# Patient Record
Sex: MALE | Race: WHITE | Employment: UNEMPLOYED | URBAN - METROPOLITAN AREA
[De-identification: names, ages, dates, MRNs, and addresses within clinical notes are randomized per-mention and may not be internally consistent; named-entity substitution may affect disease eponyms.]

---

## 2023-03-22 ENCOUNTER — HOSPITAL ENCOUNTER (INPATIENT)
Age: 18
LOS: 4 days | Discharge: HOME | DRG: 885 | End: 2023-03-27
Attending: STUDENT IN AN ORGANIZED HEALTH CARE EDUCATION/TRAINING PROGRAM | Admitting: PSYCHIATRY & NEUROLOGY
Payer: OTHER GOVERNMENT

## 2023-03-22 DIAGNOSIS — R45.851 SUICIDAL IDEATION: ICD-10-CM

## 2023-03-22 DIAGNOSIS — Z76.89 ENCOUNTER FOR PSYCHIATRIC ASSESSMENT: Primary | ICD-10-CM

## 2023-03-22 DIAGNOSIS — Z86.59 HISTORY OF COMMAND HALLUCINATIONS: ICD-10-CM

## 2023-03-22 DIAGNOSIS — F20.9 SCHIZOPHRENIA, UNSPECIFIED TYPE (HCC): ICD-10-CM

## 2023-03-22 LAB
ALBUMIN SERPL-MCNC: 4.7 G/DL (ref 3.4–5)
ALBUMIN/GLOB SERPL: 2 {RATIO} (ref 1.1–2.2)
ALP SERPL-CCNC: 53 U/L (ref 40–129)
ALT SERPL-CCNC: 9 U/L (ref 10–40)
AMPHETAMINES UR QL SCN>1000 NG/ML: ABNORMAL
ANION GAP SERPL CALCULATED.3IONS-SCNC: 10 MMOL/L (ref 3–16)
APAP SERPL-MCNC: <5 UG/ML (ref 10–30)
AST SERPL-CCNC: 17 U/L (ref 15–37)
BARBITURATES UR QL SCN>200 NG/ML: ABNORMAL
BASOPHILS # BLD: 0 K/UL (ref 0–0.2)
BASOPHILS NFR BLD: 0.9 %
BENZODIAZ UR QL SCN>200 NG/ML: ABNORMAL
BILIRUB SERPL-MCNC: 1.8 MG/DL (ref 0–1)
BUN SERPL-MCNC: 10 MG/DL (ref 7–20)
CALCIUM SERPL-MCNC: 9.2 MG/DL (ref 8.3–10.6)
CANNABINOIDS UR QL SCN>50 NG/ML: POSITIVE
CHLORIDE SERPL-SCNC: 99 MMOL/L (ref 99–110)
CO2 SERPL-SCNC: 26 MMOL/L (ref 21–32)
COCAINE UR QL SCN: ABNORMAL
CREAT SERPL-MCNC: 0.8 MG/DL (ref 0.9–1.3)
DEPRECATED RDW RBC AUTO: 13.3 % (ref 12.4–15.4)
DRUG SCREEN COMMENT UR-IMP: ABNORMAL
EOSINOPHIL # BLD: 0.1 K/UL (ref 0–0.6)
EOSINOPHIL NFR BLD: 3.3 %
ETHANOLAMINE SERPL-MCNC: NORMAL MG/DL (ref 0–0.08)
FENTANYL SCREEN, URINE: ABNORMAL
FLUAV RNA RESP QL NAA+PROBE: NOT DETECTED
FLUBV RNA RESP QL NAA+PROBE: NOT DETECTED
GFR SERPLBLD CREATININE-BSD FMLA CKD-EPI: >60 ML/MIN/{1.73_M2}
GLUCOSE SERPL-MCNC: 107 MG/DL (ref 70–99)
HCT VFR BLD AUTO: 40.2 % (ref 40.5–52.5)
HGB BLD-MCNC: 13.7 G/DL (ref 13.5–17.5)
LYMPHOCYTES # BLD: 1.7 K/UL (ref 1–5.1)
LYMPHOCYTES NFR BLD: 39.8 %
MCH RBC QN AUTO: 30.6 PG (ref 26–34)
MCHC RBC AUTO-ENTMCNC: 34.2 G/DL (ref 31–36)
MCV RBC AUTO: 89.5 FL (ref 80–100)
METHADONE UR QL SCN>300 NG/ML: ABNORMAL
MONOCYTES # BLD: 0.5 K/UL (ref 0–1.3)
MONOCYTES NFR BLD: 11.2 %
NEUTROPHILS # BLD: 1.9 K/UL (ref 1.7–7.7)
NEUTROPHILS NFR BLD: 44.8 %
OPIATES UR QL SCN>300 NG/ML: ABNORMAL
OXYCODONE UR QL SCN: ABNORMAL
PCP UR QL SCN>25 NG/ML: ABNORMAL
PH UR STRIP: 6 [PH]
PLATELET # BLD AUTO: 225 K/UL (ref 135–450)
PMV BLD AUTO: 9.2 FL (ref 5–10.5)
POTASSIUM SERPL-SCNC: 3.9 MMOL/L (ref 3.5–5.1)
PROT SERPL-MCNC: 7.1 G/DL (ref 6.4–8.2)
RBC # BLD AUTO: 4.49 M/UL (ref 4.2–5.9)
SALICYLATES SERPL-MCNC: <0.3 MG/DL (ref 15–30)
SARS-COV-2 RNA RESP QL NAA+PROBE: NOT DETECTED
SODIUM SERPL-SCNC: 135 MMOL/L (ref 136–145)
WBC # BLD AUTO: 4.4 K/UL (ref 4–11)

## 2023-03-22 PROCEDURE — 80143 DRUG ASSAY ACETAMINOPHEN: CPT

## 2023-03-22 PROCEDURE — 80307 DRUG TEST PRSMV CHEM ANLYZR: CPT

## 2023-03-22 PROCEDURE — 85025 COMPLETE CBC W/AUTO DIFF WBC: CPT

## 2023-03-22 PROCEDURE — 80053 COMPREHEN METABOLIC PANEL: CPT

## 2023-03-22 PROCEDURE — 80179 DRUG ASSAY SALICYLATE: CPT

## 2023-03-22 PROCEDURE — 87636 SARSCOV2 & INF A&B AMP PRB: CPT

## 2023-03-22 PROCEDURE — 36415 COLL VENOUS BLD VENIPUNCTURE: CPT

## 2023-03-22 PROCEDURE — 84443 ASSAY THYROID STIM HORMONE: CPT

## 2023-03-22 PROCEDURE — 99285 EMERGENCY DEPT VISIT HI MDM: CPT

## 2023-03-22 PROCEDURE — 82077 ASSAY SPEC XCP UR&BREATH IA: CPT

## 2023-03-22 RX ORDER — ILOPERIDONE 10 MG/1
10 TABLET ORAL 2 TIMES DAILY
Status: ON HOLD | COMMUNITY
End: 2023-03-27 | Stop reason: HOSPADM

## 2023-03-22 RX ORDER — DIVALPROEX SODIUM 250 MG/1
750 TABLET, DELAYED RELEASE ORAL NIGHTLY
Status: ON HOLD | COMMUNITY
End: 2023-03-27 | Stop reason: HOSPADM

## 2023-03-22 ASSESSMENT — PAIN - FUNCTIONAL ASSESSMENT: PAIN_FUNCTIONAL_ASSESSMENT: NONE - DENIES PAIN

## 2023-03-23 PROBLEM — Z00.00 ENCOUNTER FOR ROUTINE HISTORY AND PHYSICAL EXAMINATION OF ADULT: Status: ACTIVE | Noted: 2023-03-23

## 2023-03-23 PROBLEM — Z86.59 HISTORY OF COMMAND HALLUCINATIONS: Status: ACTIVE | Noted: 2023-03-23

## 2023-03-23 PROBLEM — F12.90 CANNABIS USE, UNCOMPLICATED: Status: ACTIVE | Noted: 2023-03-23

## 2023-03-23 PROBLEM — F29 PSYCHOSIS (HCC): Status: ACTIVE | Noted: 2023-03-23

## 2023-03-23 PROBLEM — F33.9 MAJOR DEPRESSION, RECURRENT (HCC): Status: ACTIVE | Noted: 2023-03-23

## 2023-03-23 PROBLEM — R45.851 SUICIDAL IDEATION: Status: ACTIVE | Noted: 2023-03-23

## 2023-03-23 LAB — TSH SERPL DL<=0.005 MIU/L-ACNC: 1.64 UIU/ML (ref 0.43–4)

## 2023-03-23 PROCEDURE — 6370000000 HC RX 637 (ALT 250 FOR IP): Performed by: PSYCHIATRY & NEUROLOGY

## 2023-03-23 PROCEDURE — 80061 LIPID PANEL: CPT

## 2023-03-23 PROCEDURE — 99221 1ST HOSP IP/OBS SF/LOW 40: CPT | Performed by: NURSE PRACTITIONER

## 2023-03-23 PROCEDURE — 36415 COLL VENOUS BLD VENIPUNCTURE: CPT

## 2023-03-23 PROCEDURE — 83036 HEMOGLOBIN GLYCOSYLATED A1C: CPT

## 2023-03-23 PROCEDURE — 93005 ELECTROCARDIOGRAM TRACING: CPT | Performed by: PSYCHIATRY & NEUROLOGY

## 2023-03-23 PROCEDURE — 1240000000 HC EMOTIONAL WELLNESS R&B

## 2023-03-23 RX ORDER — DIVALPROEX SODIUM 500 MG/1
1000 TABLET, DELAYED RELEASE ORAL NIGHTLY
Status: DISCONTINUED | OUTPATIENT
Start: 2023-03-23 | End: 2023-03-23

## 2023-03-23 RX ORDER — MAGNESIUM HYDROXIDE/ALUMINUM HYDROXICE/SIMETHICONE 120; 1200; 1200 MG/30ML; MG/30ML; MG/30ML
30 SUSPENSION ORAL EVERY 6 HOURS PRN
Status: DISCONTINUED | OUTPATIENT
Start: 2023-03-23 | End: 2023-03-27 | Stop reason: HOSPADM

## 2023-03-23 RX ORDER — TRAZODONE HYDROCHLORIDE 50 MG/1
50 TABLET ORAL NIGHTLY PRN
Status: DISCONTINUED | OUTPATIENT
Start: 2023-03-23 | End: 2023-03-27 | Stop reason: HOSPADM

## 2023-03-23 RX ORDER — HYDROXYZINE 50 MG/1
50 TABLET, FILM COATED ORAL 3 TIMES DAILY PRN
Status: DISCONTINUED | OUTPATIENT
Start: 2023-03-23 | End: 2023-03-27 | Stop reason: HOSPADM

## 2023-03-23 RX ORDER — ACETAMINOPHEN 325 MG/1
650 TABLET ORAL EVERY 8 HOURS PRN
Status: DISCONTINUED | OUTPATIENT
Start: 2023-03-23 | End: 2023-03-27 | Stop reason: HOSPADM

## 2023-03-23 RX ORDER — POLYETHYLENE GLYCOL 3350 17 G
2 POWDER IN PACKET (EA) ORAL
Status: DISCONTINUED | OUTPATIENT
Start: 2023-03-23 | End: 2023-03-23

## 2023-03-23 RX ORDER — BENZTROPINE MESYLATE 1 MG/ML
2 INJECTION INTRAMUSCULAR; INTRAVENOUS 2 TIMES DAILY PRN
Status: DISCONTINUED | OUTPATIENT
Start: 2023-03-23 | End: 2023-03-27 | Stop reason: HOSPADM

## 2023-03-23 RX ORDER — IBUPROFEN 400 MG/1
400 TABLET ORAL EVERY 6 HOURS PRN
Status: DISCONTINUED | OUTPATIENT
Start: 2023-03-23 | End: 2023-03-23

## 2023-03-23 RX ORDER — LORAZEPAM 1 MG/1
1 TABLET ORAL ONCE
Status: COMPLETED | OUTPATIENT
Start: 2023-03-23 | End: 2023-03-23

## 2023-03-23 RX ORDER — OLANZAPINE 10 MG/1
10 TABLET ORAL EVERY 4 HOURS PRN
Status: DISCONTINUED | OUTPATIENT
Start: 2023-03-23 | End: 2023-03-27 | Stop reason: HOSPADM

## 2023-03-23 RX ORDER — ACETAMINOPHEN 325 MG/1
650 TABLET ORAL EVERY 4 HOURS PRN
Status: DISCONTINUED | OUTPATIENT
Start: 2023-03-23 | End: 2023-03-23

## 2023-03-23 RX ORDER — NICOTINE 21 MG/24HR
1 PATCH, TRANSDERMAL 24 HOURS TRANSDERMAL DAILY
Status: DISCONTINUED | OUTPATIENT
Start: 2023-03-23 | End: 2023-03-23

## 2023-03-23 RX ORDER — DIVALPROEX SODIUM 500 MG/1
1000 TABLET, EXTENDED RELEASE ORAL NIGHTLY
Status: DISCONTINUED | OUTPATIENT
Start: 2023-03-23 | End: 2023-03-27 | Stop reason: HOSPADM

## 2023-03-23 RX ADMIN — TRAZODONE HYDROCHLORIDE 50 MG: 50 TABLET ORAL at 21:59

## 2023-03-23 RX ADMIN — TRAZODONE HYDROCHLORIDE 50 MG: 50 TABLET ORAL at 02:03

## 2023-03-23 RX ADMIN — HYDROXYZINE HYDROCHLORIDE 50 MG: 50 TABLET, FILM COATED ORAL at 08:50

## 2023-03-23 RX ADMIN — LORAZEPAM 1 MG: 1 TABLET ORAL at 13:15

## 2023-03-23 RX ADMIN — DIVALPROEX SODIUM 1000 MG: 500 TABLET, FILM COATED, EXTENDED RELEASE ORAL at 20:26

## 2023-03-23 RX ADMIN — OLANZAPINE 10 MG: 10 TABLET, FILM COATED ORAL at 20:26

## 2023-03-23 RX ADMIN — HYDROXYZINE HYDROCHLORIDE 50 MG: 50 TABLET, FILM COATED ORAL at 02:03

## 2023-03-23 SDOH — ECONOMIC STABILITY: INCOME INSECURITY: HOW HARD IS IT FOR YOU TO PAY FOR THE VERY BASICS LIKE FOOD, HOUSING, MEDICAL CARE, AND HEATING?: NOT VERY HARD

## 2023-03-23 SDOH — SOCIAL STABILITY: SOCIAL INSECURITY: WITHIN THE LAST YEAR, HAVE YOU BEEN AFRAID OF YOUR PARTNER OR EX-PARTNER?: NO

## 2023-03-23 SDOH — ECONOMIC STABILITY: TRANSPORTATION INSECURITY
IN THE PAST 12 MONTHS, HAS THE LACK OF TRANSPORTATION KEPT YOU FROM MEDICAL APPOINTMENTS OR FROM GETTING MEDICATIONS?: NO

## 2023-03-23 SDOH — SOCIAL STABILITY: SOCIAL INSECURITY
WITHIN THE LAST YEAR, HAVE TO BEEN RAPED OR FORCED TO HAVE ANY KIND OF SEXUAL ACTIVITY BY YOUR PARTNER OR EX-PARTNER?: NO

## 2023-03-23 SDOH — ECONOMIC STABILITY: HOUSING INSECURITY
IN THE LAST 12 MONTHS, WAS THERE A TIME WHEN YOU DID NOT HAVE A STEADY PLACE TO SLEEP OR SLEPT IN A SHELTER (INCLUDING NOW)?: NO

## 2023-03-23 SDOH — ECONOMIC STABILITY: HOUSING INSECURITY: IN THE LAST 12 MONTHS, HOW MANY PLACES HAVE YOU LIVED?: 1

## 2023-03-23 SDOH — SOCIAL STABILITY: SOCIAL INSECURITY: WITHIN THE LAST YEAR, HAVE YOU BEEN HUMILIATED OR EMOTIONALLY ABUSED IN OTHER WAYS BY YOUR PARTNER OR EX-PARTNER?: NO

## 2023-03-23 SDOH — ECONOMIC STABILITY: FOOD INSECURITY: WITHIN THE PAST 12 MONTHS, YOU WORRIED THAT YOUR FOOD WOULD RUN OUT BEFORE YOU GOT MONEY TO BUY MORE.: NEVER TRUE

## 2023-03-23 SDOH — HEALTH STABILITY: MENTAL HEALTH: HOW MANY STANDARD DRINKS CONTAINING ALCOHOL DO YOU HAVE ON A TYPICAL DAY?: PATIENT DOES NOT DRINK

## 2023-03-23 SDOH — SOCIAL STABILITY: SOCIAL INSECURITY
WITHIN THE LAST YEAR, HAVE YOU BEEN KICKED, HIT, SLAPPED, OR OTHERWISE PHYSICALLY HURT BY YOUR PARTNER OR EX-PARTNER?: NO

## 2023-03-23 SDOH — ECONOMIC STABILITY: TRANSPORTATION INSECURITY
IN THE PAST 12 MONTHS, HAS LACK OF TRANSPORTATION KEPT YOU FROM MEETINGS, WORK, OR FROM GETTING THINGS NEEDED FOR DAILY LIVING?: NO

## 2023-03-23 SDOH — HEALTH STABILITY: MENTAL HEALTH: HOW OFTEN DO YOU HAVE A DRINK CONTAINING ALCOHOL?: NEVER

## 2023-03-23 SDOH — ECONOMIC STABILITY: INCOME INSECURITY: IN THE LAST 12 MONTHS, WAS THERE A TIME WHEN YOU WERE NOT ABLE TO PAY THE MORTGAGE OR RENT ON TIME?: NO

## 2023-03-23 SDOH — HEALTH STABILITY: MENTAL HEALTH
STRESS IS WHEN SOMEONE FEELS TENSE, NERVOUS, ANXIOUS, OR CAN'T SLEEP AT NIGHT BECAUSE THEIR MIND IS TROUBLED. HOW STRESSED ARE YOU?: VERY MUCH

## 2023-03-23 SDOH — ECONOMIC STABILITY: FOOD INSECURITY: WITHIN THE PAST 12 MONTHS, THE FOOD YOU BOUGHT JUST DIDN'T LAST AND YOU DIDN'T HAVE MONEY TO GET MORE.: NEVER TRUE

## 2023-03-23 ASSESSMENT — PATIENT HEALTH QUESTIONNAIRE - PHQ9
SUM OF ALL RESPONSES TO PHQ QUESTIONS 1-9: 25
SUM OF ALL RESPONSES TO PHQ QUESTIONS 1-9: 25

## 2023-03-23 ASSESSMENT — SLEEP AND FATIGUE QUESTIONNAIRES
AVERAGE NUMBER OF SLEEP HOURS: 4
DO YOU HAVE DIFFICULTY SLEEPING: YES
DO YOU USE A SLEEP AID: NO
AVERAGE NUMBER OF SLEEP HOURS: 3
SLEEP PATTERN: DIFFICULTY FALLING ASLEEP;DISTURBED/INTERRUPTED SLEEP
SLEEP PATTERN: DIFFICULTY FALLING ASLEEP;NIGHTMARES/TERRORS;DISTURBED/INTERRUPTED SLEEP
DO YOU USE A SLEEP AID: NO
DO YOU HAVE DIFFICULTY SLEEPING: YES

## 2023-03-23 ASSESSMENT — LIFESTYLE VARIABLES
HOW MANY STANDARD DRINKS CONTAINING ALCOHOL DO YOU HAVE ON A TYPICAL DAY: PATIENT DOES NOT DRINK
HOW OFTEN DO YOU HAVE A DRINK CONTAINING ALCOHOL: NEVER

## 2023-03-23 NOTE — H&P
Hospital Medicine History & Physical      PCP: DANTE Huntley    Date of Admission: 3/22/2023    Date of Service: Pt seen/examined on 3/23/2023       Chief Complaint:    Chief Complaint   Patient presents with    Psychiatric Evaluation     Brought in by parents tonight;  parents states pt has had a recent traumatic event; and has had S/I and H/I; denies drug/ alcohol use; taking all medications as prescribed; pt states he is hearing voices and sometimes has S/I and H/I        History Of Present Illness: The patient is a 21 y.o. male with asperger's syndrome, Schizoaffective disorder, and PTSD who presented to [facility] ED with complaint of auditory hallucinations, occasional SI, HI. Patient was seen and evaluated in the ED by the ED medical provider, patient was medically cleared for admission to [facility] at [facility]. This note serves as an admission medical H&P.    PCP: DANTE OSUNA  Tobacco use: never  ETOH use: denies  Illicit drug use: Cannabis (medical Card)    Patient denies any symptoms/complaints. Past Medical History:        Diagnosis Date    Asperger's syndrome 2010    PTSD (post-traumatic stress disorder) 2021    Schizoaffective disorder ([facility] [address] 2021       Past Surgical History:    History reviewed. No pertinent surgical history. Medications Prior to Admission:    Prior to Admission medications    Medication Sig Start Date End Date Taking? Authorizing Provider   divalproex (DEPAKOTE) 250 MG DR tablet Take 750 mg by mouth nightly   Yes Historical Provider, MD   iloperidone (FANAPT) 10 MG TABS tablet Take 10 mg by mouth 2 times daily   Yes Historical Provider, MD       Allergies:  Strawberry flavor [flavoring agent]    Social History:    TOBACCO:   reports that he has never smoked. He has never used smokeless tobacco.  ETOH:   reports that he does not currently use alcohol. Family History:   Positive as follows:    History reviewed. No pertinent family history.     REVIEW OF SYSTEMS:       Constitutional:
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
## 2023-03-23 NOTE — GROUP NOTE
Group Therapy Note    Date: 3/23/2023    Group Start Time: 1100  Group End Time: 4899  Group Topic: Recreational    [facility]        Group Therapy Note    Collaborative Gameplay    Group members engaged in game Heads Up, collaborating to provide clues to peers who were guessing musicians/bands/singers. Goals addressed: group cohesion, socialization, cooperation, impulse control     Attendees: 7       Notes:  Pt present and actively engaged across gameplay, pleasant and cooperative with peers across completion of procedure. No needs verbalized at conclusion of group. Will continue encouraging group participation as appropriate. Status After Intervention:  Improved    Participation Level:  Active Listener and Interactive    Participation Quality: Appropriate and Attentive      Speech:  normal      Thought Process/Content: Logical      Affective Functioning: Congruent      Mood: euthymic      Level of consciousness:  Alert and Oriented x4      Response to Learning: Progressing to goal      Endings: None Reported    Modes of Intervention: Support, Socialization, Activity, and Media      Discipline Responsible: Psychoeducational Specialist      Signature:  Casey Muñoz MM, MT-BC

## 2023-03-23 NOTE — H&P
[address]                 [address] [address]                              HISTORY AND PHYSICAL    PATIENT NAME: Fannie Strange                     :        2005  MED REC NO:   [id number]                          ROOM:       2316  ACCOUNT NO:   [de-identified]                           ADMIT DATE: 2023  PROVIDER:     Norah Lazo MD    INDICATION  This is a domiciled, never , unemployed 49-year-old  with a self-reported history of multiple psychiatric diagnoses, who was  brought to our emergency department by his parents complaining of  worsening auditory and visual hallucinations. SOURCES OF INFORMATION:  The patient. Focused record review. CHIEF COMPLAINT:  \"Severe auditory and visual hallucinations, some  tactile. \"    HISTORY OF PRESENT ILLNESS:  The patient reports the last time he felt  his hallucinations were under decent control was about 3 months ago. Over the last few weeks, they have gotten worse. Regarding auditory  hallucinations, he says he hears multiple unfamiliar men's voices, and sometimes his own voice. They \"yell and scream at me, telling me  its all my fault. The tells me to do things which I don't want to do and will never do. \"  When asked for an example, he says \"right  now they are telling me to throw this chair and crush you with it, but I  would never do something like that, I'm not a violent person. \"  They are nearly constant. Regarding visual hallucinations, he says he sees things migrate and move  around, and occasionally sees figures. He says most of the time the  figures are shadowy and without detail, but occasionally he sees a  fully-formed detailed figure. When asked for an example says \"right now there  is a sarah sitting in the chair next to you. \"  He says he also sees  colors, and objects migrating.  He says \"sometimes I see  a large dark figure with a smile from ear-to-ear and
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
## 2023-03-23 NOTE — DISCHARGE INSTRUCTIONS
Advanced Directives:  Patient does not have a surrogate decision maker appointed   Name (if yes): N/A Phone Number: N/A  Patient does not have a psychiatric and/ or medical advanced directive or power of . Patient was offered psychiatric and/ or medical advanced directive or power of  information/completion but declined to complete   Why not? N/A    Discharge Planning is Complete. Discharge Date: 3/27/23  Reason for Hospitalization: Suicidal Ideation   Discharge Diagnosis: Psychosis [facility])  Discharging to: Private Domicile     Your instructions: Your physician here was Marjorie Corbett MD. If you have any questions please call the unit at [phone] for the adult unit or [phone] for [facility]. Please note that we have a patient family advisory [place] that meets the second Wednesday of January and the second Wednesday of July at [address] [facility] in [place] at [facility]. Department leadership would love for you to attend to give feedback on what we are doing well and areas in which we can improve our patient care. Please come if you are able and feel free to reach out to [address] [place] [address] at [phone] with any questions. The crisis number for [facility] is [phone]  You can use this number at any time to access emergency mental health services. Please follow up with your PCP regarding any pending labs. You are connected to [facility] for [address]. You have an upcoming appointment. See below.    Name of Provider: Trevor Helton   Provider specialty/license: Mental Health Therapy  Date and time of appointment: 3/27/23 at 2:00 PM  The type/s of services requested are: Mental Health Therapy  Agency name: [facility]  Address: [address] [place] [address]  Phone Number: [phone]  Special instructions (what to bring to appointment, etc.): Please call in advance if you

## 2023-03-23 NOTE — GROUP NOTE
Group Therapy Note    Date: 3/23/2023    Group Start Time: 1000  Group End Time: 6538  Group Topic: Psychoeducation    [facility]    CANDACE Miguel        Group Therapy Note  Clinician introduced the group to achieving and maintaining balance. They learned how to use the baseline of functioning to measure anxiety, depression, anger and suicidal ideation, ect. They learned how to use meditation music and visualization to achieve calmness. Attendees: 9       Patient's Goal:      Notes:  Patient was cooperative and fully engaged in the group discussion and activity. He participated in the meditation and visualization while listening to the [place] and Manuel singing bowls. Patient reported achieving calm 0-3 on his baseline of functioning after the grounding techniques. Status After Intervention:  Improved    Participation Level:  Active Listener and Interactive    Participation Quality: Appropriate, Attentive, Sharing, and Supportive      Speech:  normal      Thought Process/Content: Linear      Affective Functioning: Congruent      Mood: anxious and fearful      Level of consciousness:  Alert      Response to Learning: Able to retain information      Endings: None Reported    Modes of Intervention: Education, Support, Exploration, and Activity      Discipline Responsible: /Counselor      Signature:  CANDACE Miguel

## 2023-03-23 NOTE — ED PROVIDER NOTES
Alcohol use: Not on file    Drug use: Not on file    Sexual activity: Not on file   Other Topics Concern    Not on file   Social History Narrative    Not on file     Social Determinants of Health     Financial Resource Strain: Not on file   Food Insecurity: Not on file   Transportation Needs: Not on file   Physical Activity: Not on file   Stress: Not on file   Social Connections: Not on file   Intimate Partner Violence: Not on file   Housing Stability: Not on file     No current facility-administered medications for this encounter. Current Outpatient Medications   Medication Sig Dispense Refill    divalproex (DEPAKOTE) 250 MG DR tablet Take 750 mg by mouth nightly      iloperidone (FANAPT) 10 MG TABS tablet Take 10 mg by mouth 2 times daily       Allergies   Allergen Reactions    Strawberry Flavor [Flavoring Agent]        REVIEW OF SYSTEMS  All systems reviewed, pertinent positives per HPI otherwise noted to be negative. PHYSICAL EXAM  ED Triage Vitals [03/22/23 2052]   BP Temp Temp Source Heart Rate Resp SpO2 Height Weight   122/73 98.4 °F (36.9 °C) Oral 98 18 98 % 5' 11\" (1.803 m) 159 lb (72.1 kg)     GENERAL APPEARANCE: Awake and alert. Cooperative. no distress. HENT: Normocephalic. Atraumatic. Mucous membranes are moist  NECK: Supple. Full range of motion of the neck without stiffness or pain. EYES: PERRL. EOM's grossly intact. HEART/CHEST: RRR. No murmurs. LUNGS: Respirations unlabored. CTAB. Good air exchange. Speaking comfortably in full sentences. ABDOMEN: No tenderness. Soft. Non-distended. No masses. No organomegaly. No guarding or rebound. MUSCULOSKELETAL: No extremity edema. Compartments soft. No deformity. No tenderness in the extremities. All extremities neurovascularly intact. SKIN: Warm and dry. No acute rashes. NEUROLOGICAL: Alert and oriented. No gross facial drooping. Strength 5/5, sensation intact. PSYCHIATRIC: Reports intermittent SI/HI.   Does not appear to be

## 2023-03-24 LAB
CHOLEST SERPL-MCNC: 128 MG/DL (ref 0–199)
EKG ATRIAL RATE: 60 BPM
EKG DIAGNOSIS: NORMAL
EKG P AXIS: 22 DEGREES
EKG P-R INTERVAL: 128 MS
EKG Q-T INTERVAL: 398 MS
EKG QRS DURATION: 88 MS
EKG QTC CALCULATION (BAZETT): 398 MS
EKG R AXIS: 78 DEGREES
EKG T AXIS: 58 DEGREES
EKG VENTRICULAR RATE: 60 BPM
HDLC SERPL-MCNC: 50 MG/DL (ref 40–60)
LDLC SERPL CALC-MCNC: 53 MG/DL
TRIGL SERPL-MCNC: 126 MG/DL (ref 0–150)
VLDLC SERPL CALC-MCNC: 25 MG/DL

## 2023-03-24 PROCEDURE — 6370000000 HC RX 637 (ALT 250 FOR IP): Performed by: PSYCHIATRY & NEUROLOGY

## 2023-03-24 PROCEDURE — 93010 ELECTROCARDIOGRAM REPORT: CPT | Performed by: INTERNAL MEDICINE

## 2023-03-24 PROCEDURE — 1240000000 HC EMOTIONAL WELLNESS R&B

## 2023-03-24 RX ADMIN — TRAZODONE HYDROCHLORIDE 50 MG: 50 TABLET ORAL at 23:02

## 2023-03-24 RX ADMIN — DIVALPROEX SODIUM 1000 MG: 500 TABLET, FILM COATED, EXTENDED RELEASE ORAL at 20:36

## 2023-03-24 RX ADMIN — OLANZAPINE 10 MG: 10 TABLET, FILM COATED ORAL at 12:08

## 2023-03-24 RX ADMIN — HYDROXYZINE HYDROCHLORIDE 50 MG: 50 TABLET, FILM COATED ORAL at 12:08

## 2023-03-24 NOTE — GROUP NOTE
Group Therapy Note    Date: 3-23-23    Group Start Time: 2030  Group End Time: 2100  Group Topic: 100 Richy Mccoy RN        Group Therapy Note    Attendees: 11       Patient's Goal:  To not hear voices and have more coping skills. \"My anxiety has been through the roof. \"    Notes:  Discussed at home pt lifts weights to help him cope. Pt trying to find things to do here. Group help and made some suggestions like pacing, doing push ups, punch his pillow or mattress. Pt seemed pleased that the group helped with suggestions. Status After Intervention:  Improved    Participation Level:  Active Listener and Interactive    Participation Quality: Attentive and Sharing      Speech:  has a very soft voice      Thought Process/Content: Logical  Linear      Affective Functioning: Congruent      Mood: anxious and depressed      Level of consciousness:  Alert and Oriented x4      Response to Learning: Able to verbalize current knowledge/experience, Able to verbalize/acknowledge new learning, and Able to retain information      Endings: None Reported    Modes of Intervention: Education, Support, and Socialization      Discipline Responsible: Registered Nurse      Signature:  Nilsa Muro RN

## 2023-03-24 NOTE — BH NOTE
Patient reported having auditory and visual hallucinations. Holding head and rocking back and forth, stating, \"make them stop, make them stop\". Unable to describe the visual hallucinations and reported that the \"voices are calling me bad names\". Unable to redirect, medicated with atarax and zyprexa per prn order with effective results.

## 2023-03-24 NOTE — GROUP NOTE
Group Therapy Note    Date: 3/24/2023    Group Start Time: 1000  Group End Time: 4930  Group Topic: Psychoeducation    [address] [place] [address]        Group Therapy Note    Attendees: 8    Facilitator led a group discussion on communication. Patients explored how non-verbal communication plays an important role in how we express and receive messages to one another. To execute importance of non-verbals, patients played a game of Blinpick, with each member taking turns to express a randomly selected word with only body language and facial expressions. Patients then discussed what challenges arose and how they were, or could be, resolved. Patients were asked to think about how non-verbals play a role in their communications and relationships with others. Notes:  Patient engaged fully in group discussion and provide insight into own non-verbal communication. Patient played an active role in guessing and acting out words for the Blinpick game. Status After Intervention:  Improved    Participation Level:  Active Listener and Interactive    Participation Quality: Appropriate, Attentive, and Sharing      Speech:  normal      Thought Process/Content: Logical  Linear      Affective Functioning: Congruent      Mood: euthymic      Level of consciousness:  Alert, Oriented x4, and Attentive      Response to Learning: Able to verbalize current knowledge/experience, Capable of insight, Able to change behavior, and Progressing to goal      Endings: None Reported    Modes of Intervention: Education, Socialization, and Activity      Discipline Responsible: /Counselor      Signature:  DAMON Coombs

## 2023-03-24 NOTE — BH NOTE
Patient resting quietly in bed with eyes closed. Reported that medication \"really helped me earlier\".

## 2023-03-25 LAB
EST. AVERAGE GLUCOSE BLD GHB EST-MCNC: 88.2 MG/DL
HBA1C MFR BLD: 4.7 %

## 2023-03-25 PROCEDURE — 1240000000 HC EMOTIONAL WELLNESS R&B

## 2023-03-25 PROCEDURE — 6370000000 HC RX 637 (ALT 250 FOR IP): Performed by: PSYCHIATRY & NEUROLOGY

## 2023-03-25 RX ADMIN — TRAZODONE HYDROCHLORIDE 50 MG: 50 TABLET ORAL at 21:14

## 2023-03-25 RX ADMIN — HYDROXYZINE HYDROCHLORIDE 50 MG: 50 TABLET, FILM COATED ORAL at 10:20

## 2023-03-25 RX ADMIN — HYDROXYZINE HYDROCHLORIDE 50 MG: 50 TABLET, FILM COATED ORAL at 21:14

## 2023-03-25 RX ADMIN — OLANZAPINE 10 MG: 10 TABLET, FILM COATED ORAL at 10:20

## 2023-03-25 RX ADMIN — DIVALPROEX SODIUM 1000 MG: 500 TABLET, FILM COATED, EXTENDED RELEASE ORAL at 21:10

## 2023-03-25 RX ADMIN — HYDROXYZINE HYDROCHLORIDE 50 MG: 50 TABLET, FILM COATED ORAL at 01:09

## 2023-03-25 NOTE — GROUP NOTE
Group Therapy Note    Date: 3/25/2023    Group Start Time: 1000  Group End Time: 1100  Group Topic: Cognitive Skills    [address] [place] [address]; CANDACE Streeter        Group Therapy Note    Attendees: 8         Patient's Goal:  Patient will increase understanding of healthy relationships and what their needs are within those relationships    Notes:  Patients completed the 5 Love Languages quiz and learned about the different needs in relationships. Marixa Sloan left the group at the beginning, but returned at the end.  He was attentive, but did not participate in the discussion    Status After Intervention:  Improved    Participation Level: Minimal    Participation Quality: Attentive      Speech:  normal      Thought Process/Content: Logical      Affective Functioning: Congruent      Mood: depressed      Level of consciousness:  Alert and Attentive      Response to Learning: Progressing to goal      Endings: none reported    Modes of Intervention: Education      Discipline Responsible: /Counselor      Signature:  DIGNA Patterson

## 2023-03-25 NOTE — BH NOTE
Patient requesting anxiety pill rates anxiety 6/10. Patient medicated with Hydroxyzine 50 mg po for anxiety.

## 2023-03-25 NOTE — BH NOTE
Patient reported having auditory and visual hallucinations. Holding head and shaking it back and forth, stating, \"I didn't mean to, just stop\". Reported seeing shadow people. One on one provided, attempted distraction with walking around the unit with patient and providing meditation/relaxation in room. Unable to redirect, medicated with atarax and zyprexa per prn order with effective results.  Patient reported that another patient was talking loudly in the group and \"it triggered me, I don't like it when people talk over other people\"

## 2023-03-26 PROCEDURE — 1240000000 HC EMOTIONAL WELLNESS R&B

## 2023-03-26 PROCEDURE — 6370000000 HC RX 637 (ALT 250 FOR IP): Performed by: PSYCHIATRY & NEUROLOGY

## 2023-03-26 RX ORDER — PROPRANOLOL HYDROCHLORIDE 10 MG/1
10 TABLET ORAL ONCE
Status: COMPLETED | OUTPATIENT
Start: 2023-03-26 | End: 2023-03-26

## 2023-03-26 RX ADMIN — ACETAMINOPHEN 650 MG: 325 TABLET ORAL at 09:33

## 2023-03-26 RX ADMIN — PROPRANOLOL HYDROCHLORIDE 10 MG: 10 TABLET ORAL at 14:35

## 2023-03-26 RX ADMIN — TRAZODONE HYDROCHLORIDE 50 MG: 50 TABLET ORAL at 20:53

## 2023-03-26 RX ADMIN — DIVALPROEX SODIUM 1000 MG: 500 TABLET, FILM COATED, EXTENDED RELEASE ORAL at 20:50

## 2023-03-26 ASSESSMENT — PAIN DESCRIPTION - ORIENTATION: ORIENTATION: RIGHT;LEFT

## 2023-03-26 ASSESSMENT — PAIN DESCRIPTION - DESCRIPTORS: DESCRIPTORS: ACHING

## 2023-03-26 ASSESSMENT — PAIN DESCRIPTION - LOCATION: LOCATION: LEG

## 2023-03-26 ASSESSMENT — PAIN SCALES - GENERAL: PAINLEVEL_OUTOF10: 5

## 2023-03-26 ASSESSMENT — PAIN - FUNCTIONAL ASSESSMENT: PAIN_FUNCTIONAL_ASSESSMENT: ACTIVITIES ARE NOT PREVENTED

## 2023-03-26 NOTE — GROUP NOTE
Group Therapy Note    Date: 3/26/2023    Group Start Time: 1100  Group End Time: 6983  Group Topic: Education [address], MSW        Group Therapy Note    Attendees: 9    Group members talked about their perceptions of coping skills and what they are easy/difficult to use. They learned about How/When/Why to use coping skills as well as DBT coping skills. Patient's Goal:      Notes:  Eyal Lopeztarasamm was present for all of group and he participated fully in the conversation about coping skills. Status After Intervention:  Improved    Participation Level:  Active Listener    Participation Quality: Appropriate, Attentive, Sharing, and Supportive      Speech:  normal      Thought Process/Content: Logical      Affective Functioning: Congruent      Mood: euthymic      Level of consciousness:  Alert, Oriented x4, and Attentive      Response to Learning: Able to verbalize current knowledge/experience, Able to verbalize/acknowledge new learning, Able to retain information, Capable of insight, Able to change behavior, and Progressing to goal      Endings: None Reported    Modes of Intervention: Education, Support, Socialization, Exploration, Clarifying, and Problem-solving      Discipline Responsible: /Counselor      Signature:  CANDACE Louis

## 2023-03-26 NOTE — GROUP NOTE
Group Therapy Note    Date: 3/26/2023    Group Start Time: 1300  Group End Time: 2542  Group Topic: Relaxation    [facility]        Group Therapy Note    Attendees: 8    Group members engaged in Mindfulness/Meditation exercises: Deep Breathing and Progressive Muscle Relaxation. Patient's Goal:      Notes:  Tasneem Nathaniel was present for all of group and he fully engaged and participated in the guided meditation. Status After Intervention:  Improved    Participation Level:  Active Listener and Interactive    Participation Quality: Appropriate, Attentive, Sharing, and Supportive      Speech:  normal      Thought Process/Content: Logical      Affective Functioning: Congruent      Mood: euthymic      Level of consciousness:  Alert, Oriented x4, and Attentive      Response to Learning: Able to verbalize current knowledge/experience, Able to verbalize/acknowledge new learning, Able to retain information, Capable of insight, Able to change behavior, and Progressing to goal      Endings: None Reported    Modes of Intervention: Education, Support, Socialization, Exploration, Activity, and Movement      Discipline Responsible: /Counselor      Signature:  CANDACE De La O

## 2023-03-26 NOTE — GROUP NOTE
Group Therapy Note    Date: 3/25/2023    Group Start Time: 6849  Group End Time: 2000  Group Topic:  Group    [address] [place], [place]        Group Therapy Note    Attendees: 8       Patient's Goal:  Pt will learn the benefits of healthy coping strategies and how to overcome barriers, if presented. Pt will also discuss the differences between coping strategies and the consequences from using unhealthy coping strategies. Notes:  Pt was active in group discussion, encouraging of her peers, and stayed for the full duration of group. Pt met goal.    Status After Intervention:  Improved    Participation Level:  Active Listener and Interactive    Participation Quality: Appropriate, Attentive, Sharing, and Supportive      Speech:  soft      Thought Process/Content: Logical      Affective Functioning: Flat      Mood: anxious and depressed      Level of consciousness:  Alert, Oriented x4, and Attentive      Response to Learning: Able to verbalize current knowledge/experience, Able to verbalize/acknowledge new learning, and Progressing to goal      Endings: None Reported    Modes of Intervention: Education, Socialization, and Activity      Discipline Responsible: /Counselor      Signature:  DAMON Zepeda

## 2023-03-26 NOTE — GROUP NOTE
Group Therapy Note    Date: 3/26/2023    Group Start Time: 1500  Group End Time: 4907  Group Topic: [address] [facility]        Group Therapy Note    Attendees: 9    Group members picked 2 songs each and listened to the playlist together. Patient's Goal:      Notes:  Nelli Moore was present for all of group and he actively listened to the music. Status After Intervention:  Improved    Participation Level:  Active Listener and Interactive    Participation Quality: Appropriate, Attentive, Sharing, and Supportive      Speech:  normal      Thought Process/Content: Logical      Affective Functioning: Congruent      Mood: euthymic      Level of consciousness:  Alert, Oriented x4, and Attentive      Response to Learning: Able to verbalize current knowledge/experience, Able to verbalize/acknowledge new learning, Able to retain information, Capable of insight, Able to change behavior, and Progressing to goal      Endings: None Reported    Modes of Intervention: Support, Socialization, Activity, and Media      Discipline Responsible: /Counselor      Signature:  CANDACE Hernandez

## 2023-03-26 NOTE — BH NOTE
At 16:20 patient c/o feeling light headed, weak and nauseous. Writer encouraged patient to sit down. Vital signs obtained. Snack given. Fluids given and encourage. Writer sat with patient for several minutes. Patient ate dinner and stated that he was feeling much better. No further symptoms verbalized or noted.

## 2023-03-27 VITALS
HEART RATE: 78 BPM | DIASTOLIC BLOOD PRESSURE: 55 MMHG | RESPIRATION RATE: 16 BRPM | OXYGEN SATURATION: 97 % | HEIGHT: 71 IN | SYSTOLIC BLOOD PRESSURE: 106 MMHG | TEMPERATURE: 97.5 F | WEIGHT: 159 LBS | BODY MASS INDEX: 22.26 KG/M2

## 2023-03-27 PROCEDURE — 5130000000 HC BRIDGE APPOINTMENT

## 2023-03-27 RX ORDER — DIVALPROEX SODIUM 500 MG/1
1000 TABLET, EXTENDED RELEASE ORAL NIGHTLY
Qty: 60 TABLET | Refills: 0 | Status: ON HOLD | OUTPATIENT
Start: 2023-03-27 | End: 2023-04-26

## 2023-03-27 RX ORDER — ILOPERIDONE 10 MG/1
15 TABLET ORAL 2 TIMES DAILY
Qty: 90 TABLET | Refills: 0 | Status: ON HOLD | OUTPATIENT
Start: 2023-03-27 | End: 2023-04-26

## 2023-03-27 NOTE — DISCHARGE SUMMARY
CBC  within normal limits. COVID-19 negative. Flu negative. FORMULATION on admission: This is a domiciled, never , unemployed 20-year-old with self-reported history of multiple psychiatric diagnoses, who was brought to our emergency department by his parents reporting worsening auditory and visual hallucinations. His descriptions of his  hallucinations are atypical.  He does not present with other symptoms of  schizophrenia such as disorganized thinking nor negative symptoms. He  was admitted for further evaluation, stabilization and treatment. DIAGNOSES on admission:  1. Psychosis, unspecified. 2.  Cannabis use. 3.  Rule out autism spectrum disorder and personality disorder. Hospital Course:   1. Admitted to Psychiatry for further evaluation, stabilization and  treatment. 2.  On admission, increase Fanapt to 15 mg twice daily. Increased Depakote to 1000 mg and switched to ER formulation. Ordered q.15-minute checks for safety, programming, and p.r.n. medication for anxiety, agitation, and insomnia. 3/24/2023 - Hasn't resumed Fanapt yet - our pharmacy doesn't carry it and his mother wasn't able to bring it before this afternoon. Tolerating increased dose of Depakote. 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 Insists he would never hurt anyone or himself despite the hallucinations he reports to have. \"I know it's wrong. I'm not a violent person and I don't want to got to [facility]. \"     3/25/2023 - resumed Fanatp last night at higher dose. Restless and agitated today - pacing and shifting - but says it's not new; that ir's part of autism. Despite this, will monitor for akathisia. Overall says he's doing better and the AH have improved. Ok to play finger harp in milieu. Mr. Era Prader stabilized and improved with admission and treatment including medication, programming, and the structured milieu.  His reported auditory and

## 2023-03-27 NOTE — GROUP NOTE
Group Therapy Note    Date: 3/27/2023    Group Start Time: 1100  Group End Time: 2497  Group Topic: Psychoeducation    [address] [place] [address]        Group Therapy Note    Attendees: 7    Facilitator led a group discussion and activity on coping skills. Patients explored what coping skills are and their significance on mental health. Patients then played \"guess the coping skill\". Facilitator tati a horizontal dash for each letter of a mystery coping skill and patients had to work as a team to guess the correct letters and solve the word. Once the coping skill was solved patient discussed how to implement it and how it can benefit. Notes:  Patient was fully engaged in group discussion and activity. Status After Intervention:  Improved    Participation Level:  Active Listener and Interactive    Participation Quality: Appropriate, Attentive, and Sharing      Speech:  normal      Thought Process/Content: Logical  Linear      Affective Functioning: Congruent      Mood: euthymic      Level of consciousness:  Alert, Oriented x4, and Attentive      Response to Learning: Able to verbalize current knowledge/experience, Able to verbalize/acknowledge new learning, Able to change behavior, and Progressing to goal      Endings: None Reported    Modes of Intervention: Education, Socialization, and Activity      Discipline Responsible: /Counselor      Signature:  DAMON Mejia
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
## 2023-03-27 NOTE — BH NOTE
[facility]  Discharge Note    Pt discharged with followings belongings:   Dental Appliances: None  Vision - Corrective Lenses: Eyeglasses  Hearing Aid: None  Jewelry: None  Body Piercings Removed: N/A  Clothing: Pants, Shirt, Jacket/Coat, Hat, Slippers  Other Valuables: Wallet (and credit card)   Valuables sent home with patient or returned to patient. Patient educated on aftercare instructions: yes. Information faxed to [facility] by this writer  at 12:05 PM .Patient verbalize understanding of AVS:  yes. Status EXAM upon discharge:  Mental Status and Behavioral Exam  Normal: No  Level of Assistance: Independent/Self  Facial Expression: Brightened  Affect: Appropriate  Level of Consciousness: Alert  Frequency of Checks: 4 times per hour, close  Mood:Normal: Yes  Mood: Anxious  Motor Activity:Normal: Yes  Motor Activity: Decreased  Eye Contact: Good  Observed Behavior: Cooperative, Friendly  Sexual Misconduct History: Current - no  Preception: [place] to person, Cycl[place] to place, [place] to time, [place] to situation  Attention:Normal: No  Attention: Distractible  Thought Processes: Perseveration  Thought Content:Normal: No  Thought Content: Preoccupations  Depression Symptoms: Impaired concentration, Sleep disturbance  Anxiety Symptoms: Generalized  Stephanie Symptoms: No problems reported or observed. Hallucinations:  Auditory (comment)  Delusions: No  Delusions: Persecutory  Memory:Normal: No  Memory: Poor recent, Poor remote  Insight and Judgment: No  Insight and Judgment: Poor judgment, Poor insight    Tobacco Screening:  Practical Counseling, on admission, lucy X, if applicable and completed (first 3 are required if patient doesn't refuse):            ( ) Recognizing danger situations (included triggers and roadblocks)                    ( ) Coping skills (new ways to manage stress,relaxation techniques, changing routine, distraction)

## 2023-03-27 NOTE — GROUP NOTE
Group Therapy Note    Date: 3/27/2023    Group Start Time: 1000  Group End Time: 8310  Group Topic: Psychoeducation    [facility]    CANDACE Beasley        Group Therapy Note  Clinician introduced the group members to creating heathy routines. Clinician had the group members note the most important things to add to their routines. Patient named medications, sleep, balanced diet, hygiene, and caring for others. Patients built a daily/weekly routine to follow with their important topics they created. Attendees: 7       Patient's Goal:      Notes:  Patient was cooperative and fully engaged in the group discussion and activity. Patient completed his check list to tailor his daily routine to fit his needs. Status After Intervention:  Improved    Participation Level:  Active Listener and Interactive    Participation Quality: Appropriate, Attentive, Sharing, and Supportive      Speech:  normal      Thought Process/Content: Logical      Affective Functioning: Congruent      Mood: anxious      Level of consciousness:  Attentive      Response to Learning: Able to verbalize current knowledge/experience      Endings: None Reported    Modes of Intervention: Education, Support, Exploration, and Activity      Discipline Responsible: /Counselor      Signature:  CANDACE Beasley

## 2023-03-27 NOTE — PLAN OF CARE
Patient alert and oriented x 3. Patient seclusive to his room this evening. Patient encouraged to come out to the milieu but refused. Patient denies SI/HI/V/H. Patient states, \"The voices are making fun of me. \" \"One of the voices are telling me am fat-ass. \" \"The rest of the voices, \"I can't make out. \" Patient took HS medications. Patient denies self harm. No angry outbursts noted. Patient resting quietly in bed @ present. No c/o's voiced @ present.
Patient was pleasant and cooperative, friendly and social with staff and peers. Denies SI/HI but having AVH, when asked about hallucinations, patient stated that he felt like someone touched the back of his neck but no one was there. Patient stated that hallucinations have been less this evening. Patient stated to feeling restless and not sleeping well. PRN atarax and trazodone given. Problem: Psychosis  Goal: Will report no hallucinations or delusions  Description: INTERVENTIONS:  1. Administer medication as  ordered  2. Assist with reality testing to support increasing orientation  3. Assess if patient's hallucinations or delusions are encouraging self harm or harm to others and intervene as appropriate  3/25/2023 2131 by Chantell Velásquez LPN  Outcome: Not Progressing     Problem: Risk for Elopement  Goal: Patient will not exit the unit/facility without proper excort  3/25/2023 2131 by Chantell Velásquez LPN  Outcome: Progressing     Problem: Self Harm/Suicidality  Goal: Will have no self-injury during hospital stay  Description: INTERVENTIONS:  1. Ensure constant observer at bedside with Q15M safety checks  2. Maintain a safe environment  3. Secure patient belongings  4. Ensure family/visitors adhere to safety recommendations  5. Ensure safety tray has been added to patient's diet order  6. Every shift and PRN: Re-assess suicidal risk via Frequent Screener    3/25/2023 2131 by Chantell Velásquez LPN  Outcome: Progressing     Problem: Anxiety  Goal: Will report anxiety at manageable levels  Description: INTERVENTIONS:  1. Administer medication as ordered  2. Teach and rehearse alternative coping skills  3. Provide emotional support with 1:1 interaction with staff  3/25/2023 2131 by Chantell Velásquez LPN  Outcome: Progressing     Problem: Psychosis  Goal: Will report no hallucinations or delusions  Description: INTERVENTIONS:  1. Administer medication as  ordered  2.  Assist with reality testing to support
Problem: Risk for Elopement  Goal: Patient will not exit the unit/facility without proper excort  3/26/2023 1125 by Phyllis Vogt RN  Outcome: Not Progressing  3/25/2023 2131 by Katie Arthur LPN  Outcome: Progressing     Problem: Self Harm/Suicidality  Goal: Will have no self-injury during hospital stay  Description: INTERVENTIONS:  1. Ensure constant observer at bedside with Q15M safety checks  2. Maintain a safe environment  3. Secure patient belongings  4. Ensure family/visitors adhere to safety recommendations  5. Ensure safety tray has been added to patient's diet order  6. Every shift and PRN: Re-assess suicidal risk via Frequent Screener    3/26/2023 1125 by Phyllis Vogt RN  Outcome: Not Progressing  3/25/2023 2131 by Katie Arthur LPN  Outcome: Progressing     Problem: Anxiety  Goal: Will report anxiety at manageable levels  Description: INTERVENTIONS:  1. Administer medication as ordered  2. Teach and rehearse alternative coping skills  3. Provide emotional support with 1:1 interaction with staff  3/26/2023 1125 by Phyllis Vogt RN  Outcome: Not Progressing  3/25/2023 2131 by Katie Arthur LPN  Outcome: Progressing     Problem: Depression/Self Harm  Goal: Effect of psychiatric condition will be minimized and patient will be protected from self harm  Description: INTERVENTIONS:  1. Assess impact of patient's symptoms on level of functioning, self care needs and offer support as indicated  2. Assess patient/family knowledge of depression, impact on illness and need for teaching  3. Provide emotional support, presence and reassurance  4. Assess for possible suicidal thoughts or ideation. If patient expresses suicidal thoughts or statements do not leave alone, initiate Suicide Precautions, move to a room close to the nursing station and obtain sitter  5.  Initiate consults as appropriate with Mental Health Professional, Spiritual Care, Psychosocial CNS, and consider a recommendation to the LIP
Problem: Risk for Elopement  Goal: Patient will not exit the unit/facility without proper excort  3/26/2023 2058 by Sofi Coronel LPN  Outcome: Progressing  3/26/2023 1125 by Rossy Lambert RN  Outcome: Not Progressing     Problem: Self Harm/Suicidality  Goal: Will have no self-injury during hospital stay  Description: INTERVENTIONS:  1. Ensure constant observer at bedside with Q15M safety checks  2. Maintain a safe environment  3. Secure patient belongings  4. Ensure family/visitors adhere to safety recommendations  5. Ensure safety tray has been added to patient's diet order  6. Every shift and PRN: Re-assess suicidal risk via Frequent Screener    3/26/2023 2058 by Sofi Coronel LPN  Outcome: Progressing  3/26/2023 1125 by Rossy Lambert RN  Outcome: Not Progressing     Problem: Anxiety  Goal: Will report anxiety at manageable levels  Description: INTERVENTIONS:  1. Administer medication as ordered  2. Teach and rehearse alternative coping skills  3. Provide emotional support with 1:1 interaction with staff  3/26/2023 2058 by Sofi Coronel LPN  Outcome: Progressing  3/26/2023 1125 by Rossy Lambert RN  Outcome: Not Progressing     Problem: Depression/Self Harm  Goal: Effect of psychiatric condition will be minimized and patient will be protected from self harm  Description: INTERVENTIONS:  1. Assess impact of patient's symptoms on level of functioning, self care needs and offer support as indicated  2. Assess patient/family knowledge of depression, impact on illness and need for teaching  3. Provide emotional support, presence and reassurance  4. Assess for possible suicidal thoughts or ideation. If patient expresses suicidal thoughts or statements do not leave alone, initiate Suicide Precautions, move to a room close to the nursing station and obtain sitter  5.  Initiate consults as appropriate with Mental Health Professional, Spiritual Care, Psychosocial CNS, and consider a recommendation to the LIP
Problem: Risk for Elopement  Goal: Patient will not exit the unit/facility without proper excort  Outcome: Progressing     Problem: Self Harm/Suicidality  Goal: Will have no self-injury during hospital stay  Description: INTERVENTIONS:  1. Ensure constant observer at bedside with Q15M safety checks  2. Maintain a safe environment  3. Secure patient belongings  4. Ensure family/visitors adhere to safety recommendations  5. Ensure safety tray has been added to patient's diet order  6. Every shift and PRN: Re-assess suicidal risk via Frequent Screener    Outcome: Progressing     Problem: Anxiety  Goal: Will report anxiety at manageable levels  Description: INTERVENTIONS:  1. Administer medication as ordered  2. Teach and rehearse alternative coping skills  3. Provide emotional support with 1:1 interaction with staff  Outcome: Progressing     Problem: Depression/Self Harm  Goal: Effect of psychiatric condition will be minimized and patient will be protected from self harm  Description: INTERVENTIONS:  1. Assess impact of patient's symptoms on level of functioning, self care needs and offer support as indicated  2. Assess patient/family knowledge of depression, impact on illness and need for teaching  3. Provide emotional support, presence and reassurance  4. Assess for possible suicidal thoughts or ideation. If patient expresses suicidal thoughts or statements do not leave alone, initiate Suicide Precautions, move to a room close to the nursing station and obtain sitter  5.  Initiate consults as appropriate with Mental Health Professional, Spiritual Care, Psychosocial CNS, and consider a recommendation to the LIP for a Psychiatric Consultation  Outcome: Progressing
Problem: Self Harm/Suicidality  Goal: Will have no self-injury during hospital stay  Description: INTERVENTIONS:  1. Ensure constant observer at bedside with Q15M safety checks  2. Maintain a safe environment  3. Secure patient belongings  4. Ensure family/visitors adhere to safety recommendations  5. Ensure safety tray has been added to patient's diet order  6. Every shift and PRN: Re-assess suicidal risk via Frequent Screener    3/23/2023 1449 by Ramila Talamantes LPN  Outcome: Progressing  Flowsheets (Taken 3/23/2023 1002)  Will have no self-injury during hospital stay:   Ensure constant observer at bedside with Q15M safety checks   Ensure safety tray has been added to patient's diet order   Maintain a safe environment   Every shift and PRN: Re-assess suicidal risk via Frequent Screener     Problem: Anxiety  Goal: Will report anxiety at manageable levels  Description: INTERVENTIONS:  1. Administer medication as ordered  2. Teach and rehearse alternative coping skills  3. Provide emotional support with 1:1 interaction with staff  3/23/2023 1449 by Ramila Talamantes LPN  Outcome: Progressing  Flowsheets  Taken 3/23/2023 1325  Will report anxiety at manageable levels:   Administer medication as ordered   Provide emotional support with 1:1 interaction with staff  Taken 3/23/2023 1002  Will report anxiety at manageable levels: Administer medication as ordered   Pt. Denies SI. Continues to be withdrawn but has made progress coming out of his room. Anxiety controlled today. Pt. Reports auditory and visual hallucinations that influence him to \"harm others\", pt.  States he will not listen and knows they are not real.
SI/HI, reported that the \"voices are not as loud and I still see shadow people at times\". Reported their mood as \"I'm not sure\". Compliant with medications.
knowledge of depression, impact on illness and need for teaching   Provide emotional support, presence and reassurance   Assess for suicidal thoughts or ideation. If patient expresses suicidal thoughts or statements do not leave alone, initiate Suicide Precautions, move near nurse station, obtain sitter   Initiate consults as appropriate with Mental Health Professional, Spiritual Care, Psychosocial CNS, and consider a recommendation to the LIP for a Psychiatric Consultation  Taken 3/23/2023 1324 by April BELINDA Talamantes  Effect of psychiatric condition will be minimized and patient will be protected from self harm:   Assess for suicidal thoughts or ideation. If patient expresses suicidal thoughts or statements do not leave alone, initiate Suicide Precautions, move near nurse station, obtain sitter   Provide emotional support, presence and reassurance     Problem: Psychosis  Goal: Will report no hallucinations or delusions  Description: INTERVENTIONS:  1. Administer medication as  ordered  2. Assist with reality testing to support increasing orientation  3. Assess if patient's hallucinations or delusions are encouraging self harm or harm to others and intervene as appropriate  Outcome: Not Progressing   Pt shows no indication of wanting to elope. Pt denies SI and does CFS. Anxiety, Depression, Psychosis  Pt does seem anxious and depressed. He did attend group tonight and was able to talk in group. Pt is blunted. Pt is having AVH. States the voices tell him to harm himself. Seems frustrated and would like something to help the voices. Gave Zyprexz 10 mg po. Pt appeared more relaxed following the medication. Trazodone 50 mg given po at 2201. Pt sleeping by 2245.
not leave alone, initiate Suicide Precautions, move near nurse station, obtain sitter   Initiate consults as appropriate with Mental Health Professional, Spiritual Care, Psychosocial CNS, and consider a recommendation to the LIP for a Psychiatric Consultation  Taken 3/23/2023 1324 by Ramila Talamantes LPN  Effect of psychiatric condition will be minimized and patient will be protected from self harm:   Assess for suicidal thoughts or ideation. If patient expresses suicidal thoughts or statements do not leave alone, initiate Suicide Precautions, move near nurse station, obtain sitter   Provide emotional support, presence and reassurance     Problem: Psychosis  Goal: Will report no hallucinations or delusions  Description: INTERVENTIONS:  1. Administer medication as  ordered  2. Assist with reality testing to support increasing orientation  3.  Assess if patient's hallucinations or delusions are encouraging self harm or harm to others and intervene as appropriate  3/24/2023 1035 by Sarah Goodwin RN  Outcome: Progressing  3/24/2023 0256 by Wanda Marsh RN  Outcome: Not Progressing
thoughts or statements do not leave alone, initiate Suicide Precautions, move to a room close to the nursing station and obtain sitter  5. Initiate consults as appropriate with Mental Health Professional, Spiritual Care, Psychosocial CNS, and consider a recommendation to the LIP for a Psychiatric Consultation  3/26/2023 1125 by Zabrina Blanco RN  Outcome: Not Progressing     Problem: Psychosis  Goal: Will report no hallucinations or delusions  Description: INTERVENTIONS:  1. Administer medication as  ordered  2. Assist with reality testing to support increasing orientation  3.  Assess if patient's hallucinations or delusions are encouraging self harm or harm to others and intervene as appropriate  3/26/2023 2059 by Antonio Modi LPN  Outcome: Not Progressing  3/26/2023 1125 by Zabrina Blanco RN  Outcome: Not Progressing

## 2023-03-27 NOTE — GROUP NOTE
Group Therapy Note    Date: 3/26/2023    Group Start Time: 2000  Group End Time: 2020  Group Topic: [address] DAMON Callahan        Group Therapy Note    Attendees: 9       Patient's Goal:  \"Learn new coping skills\"    Notes:  Met goal    Status After Intervention:  Improved    Participation Level:  Active Listener and Interactive    Participation Quality: Appropriate, Attentive, Sharing, and Supportive      Speech:  normal      Thought Process/Content: Logical      Affective Functioning: Congruent      Mood: euthymic      Level of consciousness:  Alert, Oriented x4, and Attentive      Response to Learning: Able to verbalize current knowledge/experience, Able to verbalize/acknowledge new learning, and Progressing to goal      Endings: None Reported    Modes of Intervention: Socialization      Discipline Responsible: /Counselor      Signature:  DAMON Diallo

## 2023-03-29 ENCOUNTER — HOSPITAL ENCOUNTER (INPATIENT)
Age: 18
LOS: 4 days | Discharge: HOME | DRG: 885 | End: 2023-04-03
Attending: STUDENT IN AN ORGANIZED HEALTH CARE EDUCATION/TRAINING PROGRAM | Admitting: PSYCHIATRY & NEUROLOGY
Payer: OTHER GOVERNMENT

## 2023-03-29 DIAGNOSIS — R44.0 AUDITORY HALLUCINATIONS: Primary | ICD-10-CM

## 2023-03-29 LAB
ALBUMIN SERPL-MCNC: 4.9 G/DL (ref 3.4–5)
ALBUMIN/GLOB SERPL: 2.5 {RATIO} (ref 1.1–2.2)
ALP SERPL-CCNC: 48 U/L (ref 40–129)
ALT SERPL-CCNC: 10 U/L (ref 10–40)
ANION GAP SERPL CALCULATED.3IONS-SCNC: 15 MMOL/L (ref 3–16)
APAP SERPL-MCNC: <5 UG/ML (ref 10–30)
AST SERPL-CCNC: 10 U/L (ref 15–37)
BASOPHILS # BLD: 0 K/UL (ref 0–0.2)
BASOPHILS NFR BLD: 0.1 %
BILIRUB SERPL-MCNC: 0.3 MG/DL (ref 0–1)
BUN SERPL-MCNC: 11 MG/DL (ref 7–20)
CALCIUM SERPL-MCNC: 10.2 MG/DL (ref 8.3–10.6)
CHLORIDE SERPL-SCNC: 101 MMOL/L (ref 99–110)
CO2 SERPL-SCNC: 27 MMOL/L (ref 21–32)
CREAT SERPL-MCNC: 0.7 MG/DL (ref 0.9–1.3)
DEPRECATED RDW RBC AUTO: 13.2 % (ref 12.4–15.4)
EOSINOPHIL # BLD: 0 K/UL (ref 0–0.6)
EOSINOPHIL NFR BLD: 0.1 %
ETHANOLAMINE SERPL-MCNC: NORMAL MG/DL (ref 0–0.08)
FLUAV RNA RESP QL NAA+PROBE: NOT DETECTED
FLUBV RNA RESP QL NAA+PROBE: NOT DETECTED
GFR SERPLBLD CREATININE-BSD FMLA CKD-EPI: >60 ML/MIN/{1.73_M2}
GLUCOSE SERPL-MCNC: 114 MG/DL (ref 70–99)
HCT VFR BLD AUTO: 42.6 % (ref 40.5–52.5)
HGB BLD-MCNC: 14.4 G/DL (ref 13.5–17.5)
LYMPHOCYTES # BLD: 1.1 K/UL (ref 1–5.1)
LYMPHOCYTES NFR BLD: 15.7 %
MCH RBC QN AUTO: 30.1 PG (ref 26–34)
MCHC RBC AUTO-ENTMCNC: 33.8 G/DL (ref 31–36)
MCV RBC AUTO: 89.3 FL (ref 80–100)
MONOCYTES # BLD: 0.5 K/UL (ref 0–1.3)
MONOCYTES NFR BLD: 7.3 %
NEUTROPHILS # BLD: 5.5 K/UL (ref 1.7–7.7)
NEUTROPHILS NFR BLD: 76.8 %
PLATELET # BLD AUTO: 206 K/UL (ref 135–450)
PMV BLD AUTO: 8.8 FL (ref 5–10.5)
POTASSIUM SERPL-SCNC: 4.1 MMOL/L (ref 3.5–5.1)
PROT SERPL-MCNC: 6.9 G/DL (ref 6.4–8.2)
RBC # BLD AUTO: 4.77 M/UL (ref 4.2–5.9)
SALICYLATES SERPL-MCNC: <0.3 MG/DL (ref 15–30)
SARS-COV-2 RNA RESP QL NAA+PROBE: NOT DETECTED
SODIUM SERPL-SCNC: 143 MMOL/L (ref 136–145)
WBC # BLD AUTO: 7.2 K/UL (ref 4–11)

## 2023-03-29 PROCEDURE — 99285 EMERGENCY DEPT VISIT HI MDM: CPT

## 2023-03-29 PROCEDURE — 85025 COMPLETE CBC W/AUTO DIFF WBC: CPT

## 2023-03-29 PROCEDURE — 80179 DRUG ASSAY SALICYLATE: CPT

## 2023-03-29 PROCEDURE — 82077 ASSAY SPEC XCP UR&BREATH IA: CPT

## 2023-03-29 PROCEDURE — 6370000000 HC RX 637 (ALT 250 FOR IP): Performed by: REGISTERED NURSE

## 2023-03-29 PROCEDURE — 80143 DRUG ASSAY ACETAMINOPHEN: CPT

## 2023-03-29 PROCEDURE — 36415 COLL VENOUS BLD VENIPUNCTURE: CPT

## 2023-03-29 PROCEDURE — 87636 SARSCOV2 & INF A&B AMP PRB: CPT

## 2023-03-29 PROCEDURE — 80053 COMPREHEN METABOLIC PANEL: CPT

## 2023-03-29 RX ORDER — HYDROXYZINE 50 MG/1
50 TABLET, FILM COATED ORAL ONCE
Status: COMPLETED | OUTPATIENT
Start: 2023-03-29 | End: 2023-03-29

## 2023-03-29 RX ADMIN — HYDROXYZINE HYDROCHLORIDE 50 MG: 50 TABLET, FILM COATED ORAL at 22:17

## 2023-03-29 ASSESSMENT — ENCOUNTER SYMPTOMS
SORE THROAT: 0
DIARRHEA: 0
RHINORRHEA: 0
COUGH: 0
CHEST TIGHTNESS: 0
NAUSEA: 0
SHORTNESS OF BREATH: 0
BACK PAIN: 0
CONSTIPATION: 0
VOMITING: 0
ABDOMINAL PAIN: 0

## 2023-03-29 ASSESSMENT — PAIN - FUNCTIONAL ASSESSMENT: PAIN_FUNCTIONAL_ASSESSMENT: NONE - DENIES PAIN

## 2023-03-30 PROBLEM — F39 PSYCHOSIS, AFFECTIVE (HCC): Status: ACTIVE | Noted: 2023-03-30

## 2023-03-30 PROCEDURE — 1240000000 HC EMOTIONAL WELLNESS R&B

## 2023-03-30 PROCEDURE — 6370000000 HC RX 637 (ALT 250 FOR IP): Performed by: PSYCHIATRY & NEUROLOGY

## 2023-03-30 PROCEDURE — 93005 ELECTROCARDIOGRAM TRACING: CPT | Performed by: PSYCHIATRY & NEUROLOGY

## 2023-03-30 RX ORDER — IBUPROFEN 400 MG/1
400 TABLET ORAL EVERY 6 HOURS PRN
Status: DISCONTINUED | OUTPATIENT
Start: 2023-03-30 | End: 2023-03-30

## 2023-03-30 RX ORDER — DIVALPROEX SODIUM 500 MG/1
1000 TABLET, EXTENDED RELEASE ORAL NIGHTLY
Status: DISCONTINUED | OUTPATIENT
Start: 2023-03-30 | End: 2023-04-03 | Stop reason: HOSPADM

## 2023-03-30 RX ORDER — POLYETHYLENE GLYCOL 3350 17 G
2 POWDER IN PACKET (EA) ORAL
Status: DISCONTINUED | OUTPATIENT
Start: 2023-03-30 | End: 2023-04-03 | Stop reason: HOSPADM

## 2023-03-30 RX ORDER — NICOTINE 21 MG/24HR
1 PATCH, TRANSDERMAL 24 HOURS TRANSDERMAL DAILY
Status: DISCONTINUED | OUTPATIENT
Start: 2023-03-30 | End: 2023-03-30

## 2023-03-30 RX ORDER — HYDROXYZINE 50 MG/1
50 TABLET, FILM COATED ORAL 3 TIMES DAILY PRN
Status: DISCONTINUED | OUTPATIENT
Start: 2023-03-30 | End: 2023-04-03 | Stop reason: HOSPADM

## 2023-03-30 RX ORDER — ACETAMINOPHEN 325 MG/1
650 TABLET ORAL EVERY 8 HOURS PRN
Status: DISCONTINUED | OUTPATIENT
Start: 2023-03-30 | End: 2023-04-03 | Stop reason: HOSPADM

## 2023-03-30 RX ORDER — OLANZAPINE 10 MG/1
10 TABLET ORAL 2 TIMES DAILY PRN
Status: DISCONTINUED | OUTPATIENT
Start: 2023-03-30 | End: 2023-04-03 | Stop reason: HOSPADM

## 2023-03-30 RX ORDER — BENZTROPINE MESYLATE 1 MG/ML
2 INJECTION INTRAMUSCULAR; INTRAVENOUS 2 TIMES DAILY PRN
Status: DISCONTINUED | OUTPATIENT
Start: 2023-03-30 | End: 2023-04-03 | Stop reason: HOSPADM

## 2023-03-30 RX ORDER — OLANZAPINE 10 MG/1
10 TABLET ORAL EVERY 4 HOURS PRN
Status: DISCONTINUED | OUTPATIENT
Start: 2023-03-30 | End: 2023-03-30

## 2023-03-30 RX ORDER — MAGNESIUM HYDROXIDE/ALUMINUM HYDROXICE/SIMETHICONE 120; 1200; 1200 MG/30ML; MG/30ML; MG/30ML
30 SUSPENSION ORAL EVERY 6 HOURS PRN
Status: DISCONTINUED | OUTPATIENT
Start: 2023-03-30 | End: 2023-04-03 | Stop reason: HOSPADM

## 2023-03-30 RX ORDER — TRAZODONE HYDROCHLORIDE 50 MG/1
50 TABLET ORAL NIGHTLY PRN
Status: DISCONTINUED | OUTPATIENT
Start: 2023-03-30 | End: 2023-03-30

## 2023-03-30 RX ORDER — ACETAMINOPHEN 325 MG/1
650 TABLET ORAL EVERY 4 HOURS PRN
Status: DISCONTINUED | OUTPATIENT
Start: 2023-03-30 | End: 2023-03-30

## 2023-03-30 RX ORDER — HYDROXYZINE 50 MG/1
50 TABLET, FILM COATED ORAL 3 TIMES DAILY PRN
Status: DISCONTINUED | OUTPATIENT
Start: 2023-03-30 | End: 2023-03-30

## 2023-03-30 RX ADMIN — OLANZAPINE 10 MG: 10 TABLET, FILM COATED ORAL at 15:46

## 2023-03-30 RX ADMIN — DIVALPROEX SODIUM 1000 MG: 500 TABLET, FILM COATED, EXTENDED RELEASE ORAL at 23:56

## 2023-03-30 RX ADMIN — HYDROXYZINE HYDROCHLORIDE 50 MG: 50 TABLET, FILM COATED ORAL at 10:17

## 2023-03-30 ASSESSMENT — PATIENT HEALTH QUESTIONNAIRE - PHQ9: SUM OF ALL RESPONSES TO PHQ QUESTIONS 1-9: 21

## 2023-03-30 ASSESSMENT — SLEEP AND FATIGUE QUESTIONNAIRES
DO YOU USE A SLEEP AID: NO
DO YOU HAVE DIFFICULTY SLEEPING: YES
SLEEP PATTERN: DIFFICULTY FALLING ASLEEP;NIGHTMARES/TERRORS;DISTURBED/INTERRUPTED SLEEP
AVERAGE NUMBER OF SLEEP HOURS: 3

## 2023-03-30 NOTE — ED NOTES
Patient offers complaint of anxiety.; Patient was offered and he accepted atarax 50 mg po.       Jerry Garza RN  03/29/23 7492

## 2023-03-30 NOTE — ED PROVIDER NOTES
Patient signed out to me by Melanie Hidalgo NP. Please refer to her note regarding presentation evaluation to this point. In brief, this 78-year-old male is presenting with anxiety and hallucinations. He had a recent psychiatric admission and was discharged 2 days ago. He reports increasing anxiety and hallucinations Telling him to harm himself. Lab work here was obtained and is reassuring. At the time of signout patient has been medically clear for psychiatric evaluation. After psychiatric evaluation, patient admitted for further treatment.     Impression: Hallucinations     Matt Smith DO  03/30/23 8226
clear.   Eyes:      General: No scleral icterus. Right eye: No discharge. Left eye: No discharge. Extraocular Movements: Extraocular movements intact. Conjunctiva/sclera: Conjunctivae normal.   Cardiovascular:      Rate and Rhythm: Normal rate and regular rhythm. Pulses: Normal pulses. Radial pulses are 2+ on the right side and 2+ on the left side. Heart sounds: Normal heart sounds. No murmur heard. No friction rub. No gallop. Pulmonary:      Effort: Pulmonary effort is normal. No respiratory distress. Breath sounds: Normal breath sounds. No stridor. No wheezing, rhonchi or rales. Musculoskeletal:         General: Normal range of motion. Cervical back: Normal range of motion and neck supple. Skin:     General: Skin is warm and dry. Capillary Refill: Capillary refill takes less than 2 seconds. Neurological:      General: No focal deficit present. Mental Status: He is alert and oriented to person, place, and time. GCS: GCS eye subscore is 4. GCS verbal subscore is 5. GCS motor subscore is 6. Psychiatric:         Attention and Perception: Attention normal. He perceives auditory and visual hallucinations. Mood and Affect: Mood is anxious. Affect is flat. Speech: Speech is rapid and pressured. Behavior: Behavior normal. Behavior is cooperative. Thought Content: Thought content is not paranoid or delusional. Thought content does not include homicidal or suicidal ideation. Thought content does not include homicidal or suicidal plan.          Cognition and Memory: Cognition and memory normal.         Judgment: Judgment normal.      Comments: States hes been having increased feelings of anxiety as well as auditory and visual hallucinations     PHYSICAL EXAM  /78   Pulse 80   Temp 97.4 °F (36.3 °C) (Oral)   Resp 18   Ht 5' 11\" (1.803 m)   Wt 159 lb (72.1 kg)   SpO2 99%   BMI 22.18 kg/m²

## 2023-03-30 NOTE — ED NOTES
To [facility] in hospital attire. Placed in treatment room B3. Calm, and cooperative. Will continue to monitor for pt safety.        Nellie Faulkner  03/29/23 9485
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
## 2023-03-30 NOTE — ED NOTES
Currently resting in treatment room. Appears to be alert, awake, and lying in bed. Will continue to monitor.      Dada Olivares  03/30/23 0137

## 2023-03-30 NOTE — H&P
H&P done with the last 30 days.      ASSESSMENT/PLAN:  Depression, PTSD, schizoaffective disorder  -management per psychiatry     Cannabis use  -medical card       Nicky Gonzalez FNP-C  3/30/2023
domiciled, never , unemployed 61-year-old  with a history of psychotic symptoms, autism, depression, and cannabis  use whose parents brought him to our emergency department with  worsening agitation, distress, and hallucinations. According to his  parents, this is in the setting of treatment nonadherence. He was  admitted for further stabilization and treatment. DIAGNOSES:  1. Psychosis, unspecified. 2.  Autism spectrum disorder. 3.  Cannabis use disorder, moderate dependence. PLAN:  1. Admit to Psychiatry for further evaluation and stabilization. 2.  Continue Depakote 1000 mg nightly. Discontinue Fanapt as he maybe  experiencing significant side effects including akathisia. hold  on starting a new antipsychotic for now. Order q.15-minute checks for  safety, programming, and p.r.n. medication for anxiety, agitation, and  insomnia. 3.  Hospitalist consult for admission. 4.  Collateral information collected at [facility]FF[facility]ATE OF [facility]. 5.  Estimated length of stay 5-8 days for stabilization. He is  voluntary. Seventy five minutes were spent with the patient completing this  evaluation and more than 50% of the time was spent completing this  evaluation, providing counseling, and planning treatment with the  patient.         Tad Gonzales MD    D: 03/30/2023 15:25:27       T: 03/30/2023 15:31:12     [email or web address]  Job#: [id number]     Doc#: [id number]    CC:

## 2023-03-30 NOTE — DISCHARGE INSTRUCTIONS
Valuable(s) Were Given To: Pt mother took items while in ED    By signing below, I understand and acknowledge receipt of the instructions indicated above.

## 2023-03-31 PROBLEM — F84.0 AUTISM SPECTRUM DISORDER: Status: ACTIVE | Noted: 2023-03-31

## 2023-03-31 LAB
EKG ATRIAL RATE: 67 BPM
EKG DIAGNOSIS: NORMAL
EKG P AXIS: 27 DEGREES
EKG P-R INTERVAL: 120 MS
EKG Q-T INTERVAL: 398 MS
EKG QRS DURATION: 78 MS
EKG QTC CALCULATION (BAZETT): 420 MS
EKG R AXIS: 79 DEGREES
EKG T AXIS: 58 DEGREES
EKG VENTRICULAR RATE: 67 BPM

## 2023-03-31 PROCEDURE — 6370000000 HC RX 637 (ALT 250 FOR IP): Performed by: PSYCHIATRY & NEUROLOGY

## 2023-03-31 PROCEDURE — 93010 ELECTROCARDIOGRAM REPORT: CPT | Performed by: INTERNAL MEDICINE

## 2023-03-31 PROCEDURE — 1240000000 HC EMOTIONAL WELLNESS R&B

## 2023-03-31 RX ORDER — PALIPERIDONE 3 MG/1
3 TABLET, EXTENDED RELEASE ORAL DAILY
Status: DISCONTINUED | OUTPATIENT
Start: 2023-03-31 | End: 2023-04-03 | Stop reason: HOSPADM

## 2023-03-31 RX ADMIN — PALIPERIDONE 3 MG: 3 TABLET, EXTENDED RELEASE ORAL at 15:09

## 2023-03-31 RX ADMIN — HYDROXYZINE HYDROCHLORIDE 50 MG: 50 TABLET, FILM COATED ORAL at 20:28

## 2023-03-31 RX ADMIN — ACETAMINOPHEN 650 MG: 325 TABLET ORAL at 10:14

## 2023-03-31 RX ADMIN — DIVALPROEX SODIUM 1000 MG: 500 TABLET, FILM COATED, EXTENDED RELEASE ORAL at 20:28

## 2023-03-31 RX ADMIN — OLANZAPINE 10 MG: 10 TABLET, FILM COATED ORAL at 10:16

## 2023-03-31 ASSESSMENT — PAIN DESCRIPTION - LOCATION: LOCATION: HEAD

## 2023-03-31 ASSESSMENT — PAIN DESCRIPTION - DESCRIPTORS: DESCRIPTORS: ACHING

## 2023-03-31 ASSESSMENT — PAIN SCALES - GENERAL: PAINLEVEL_OUTOF10: 10

## 2023-03-31 NOTE — BH NOTE
Patient c/o headache. Given tylenol per order, see MAR. Also has c/o hearing voices. Dr. Yaneth Stoner aware. Given olanzapine per order.

## 2023-04-01 PROCEDURE — 1240000000 HC EMOTIONAL WELLNESS R&B

## 2023-04-01 PROCEDURE — 6370000000 HC RX 637 (ALT 250 FOR IP): Performed by: PSYCHIATRY & NEUROLOGY

## 2023-04-01 RX ADMIN — ALUMINUM HYDROXIDE, MAGNESIUM HYDROXIDE, AND SIMETHICONE 30 ML: 200; 200; 20 SUSPENSION ORAL at 22:28

## 2023-04-01 RX ADMIN — DIVALPROEX SODIUM 1000 MG: 500 TABLET, FILM COATED, EXTENDED RELEASE ORAL at 20:13

## 2023-04-01 RX ADMIN — OLANZAPINE 10 MG: 10 TABLET, FILM COATED ORAL at 22:46

## 2023-04-01 RX ADMIN — PALIPERIDONE 3 MG: 3 TABLET, EXTENDED RELEASE ORAL at 08:43

## 2023-04-01 RX ADMIN — HYDROXYZINE HYDROCHLORIDE 50 MG: 50 TABLET, FILM COATED ORAL at 18:43

## 2023-04-01 ASSESSMENT — PAIN DESCRIPTION - DESCRIPTORS
DESCRIPTORS: OTHER (COMMENT);CRAMPING
DESCRIPTORS: ACHING
DESCRIPTORS: ACHING

## 2023-04-01 ASSESSMENT — PAIN - FUNCTIONAL ASSESSMENT
PAIN_FUNCTIONAL_ASSESSMENT: ACTIVITIES ARE NOT PREVENTED

## 2023-04-01 ASSESSMENT — PAIN SCALES - WONG BAKER
WONGBAKER_NUMERICALRESPONSE: 0
WONGBAKER_NUMERICALRESPONSE: 0

## 2023-04-01 ASSESSMENT — PAIN DESCRIPTION - PAIN TYPE
TYPE: CHRONIC PAIN
TYPE: CHRONIC PAIN

## 2023-04-01 ASSESSMENT — PAIN DESCRIPTION - ONSET
ONSET: GRADUAL
ONSET: GRADUAL

## 2023-04-01 ASSESSMENT — PAIN DESCRIPTION - LOCATION
LOCATION: ABDOMEN
LOCATION: ABDOMEN

## 2023-04-01 ASSESSMENT — PAIN SCALES - GENERAL
PAINLEVEL_OUTOF10: 0
PAINLEVEL_OUTOF10: 0
PAINLEVEL_OUTOF10: 5
PAINLEVEL_OUTOF10: 0
PAINLEVEL_OUTOF10: 0

## 2023-04-01 ASSESSMENT — PAIN DESCRIPTION - FREQUENCY
FREQUENCY: INTERMITTENT
FREQUENCY: INTERMITTENT

## 2023-04-01 NOTE — CARE COORDINATION
04/01/23 [facility]   C-SSRS Suicide Screening   1) Within the past month, have you wished you were dead or wished you could go to sleep and not wake up? No   2) Have you actually had any thoughts of killing yourself? No   3) Have you been thinking about how you might kill yourself? No   4) Have you had these thoughts and had some intention of acting on them? No   5) Have you started to work out or worked out the details of how to kill yourself? Do you intend to carry out this plan? No   6) Have you ever done anything, started to do anything, or prepared to do anything to end your life? No   Did this occur within the past 3 months? No   Risk of Suicide No Risk   C-SSRS Risk Assessment   Suicidal and Self-Injurious Behavior  Actual suicide attempt - Lifetime   Suicidal Ideation (Most Severe in Past Month) Denies suicidal ideation   Treatment History Previous psychiatric diagnosis and treatments   Clinical Status (Recent) Aggressive behavior towards others   Protective Factors (Recent) Identifies reasons for living   Other Risk Factors Communciation   Other Protective Factors Does have support     Pt is a readmit. Completed CSSR-S. No thoughts of hurting self.     Vaishnavi Peguero, MSW, LSW

## 2023-04-02 PROCEDURE — 1240000000 HC EMOTIONAL WELLNESS R&B

## 2023-04-02 PROCEDURE — 6370000000 HC RX 637 (ALT 250 FOR IP): Performed by: PSYCHIATRY & NEUROLOGY

## 2023-04-02 RX ADMIN — HYDROXYZINE HYDROCHLORIDE 50 MG: 50 TABLET, FILM COATED ORAL at 21:36

## 2023-04-02 RX ADMIN — HYDROXYZINE HYDROCHLORIDE 50 MG: 50 TABLET, FILM COATED ORAL at 15:25

## 2023-04-02 RX ADMIN — PALIPERIDONE 3 MG: 3 TABLET, EXTENDED RELEASE ORAL at 08:31

## 2023-04-02 RX ADMIN — ALUMINUM HYDROXIDE, MAGNESIUM HYDROXIDE, AND SIMETHICONE 30 ML: 200; 200; 20 SUSPENSION ORAL at 22:49

## 2023-04-02 RX ADMIN — DIVALPROEX SODIUM 1000 MG: 500 TABLET, FILM COATED, EXTENDED RELEASE ORAL at 21:36

## 2023-04-02 ASSESSMENT — PAIN SCALES - WONG BAKER: WONGBAKER_NUMERICALRESPONSE: 0

## 2023-04-02 ASSESSMENT — PAIN SCALES - GENERAL
PAINLEVEL_OUTOF10: 1
PAINLEVEL_OUTOF10: 0

## 2023-04-02 NOTE — CARE COORDINATION
[facility]  Treatment Team Note  Review Date & Time:   4/1/23 0900    Patient was not in treatment team      Status EXAM:   Mental Status and Behavioral Exam  Normal: No  Level of Assistance: Independent/Self  Facial Expression: Flat  Affect: Blunt  Level of Consciousness: Alert  Frequency of Checks: 4 times per hour, close  Mood:Normal: No  Mood: Anxious, Depressed  Motor Activity:Normal: No  Motor Activity: Decreased  Eye Contact: Good  Observed Behavior: Cooperative, Withdrawn  Sexual Misconduct History: Current - no  Preception: [place] to person, [place] to time, [place] to place  Attention:Normal: No  Attention: Unable to concentrate  Thought Processes: Circumstantial  Thought Content:Normal: No  Thought Content: Poverty of content  Depression Symptoms: Change in energy level  Anxiety Symptoms: Generalized  Stephanie Symptoms: No problems reported or observed. Hallucinations: Auditory (comment) (patient reports at baseline)  Delusions: No  Delusions: Persecutory  Memory:Normal: No  Memory: Poor recent, Poor remote  Insight and Judgment: No  Insight and Judgment: Poor judgment, Poor insight      Suicide Risk CSSR-S:  1) Within the past month, have you wished you were dead or wished you could go to sleep and not wake up? : No  2) Have you actually had any thoughts of killing yourself? : No  3) Have you been thinking about how you might kill yourself? : No  5) Have you started to work out or worked out the details of how to kill yourself? Do you intend to carry out this plan? : No  6) Have you ever done anything, started to do anything, or prepared to do anything to end your life?: No      PLAN/TREATMENT RECOMMENDATIONS UPDATE: Patient will take medication as prescribed, eat 75% of meals, attend groups, participate in milieu activities, participate in treatment team and care planning for discharge and follow up.             Jersey Peacock RN

## 2023-04-02 NOTE — GROUP NOTE
Group Therapy Note    Date: 4/2/2023    Group Start Time: 2040  Group End Time: 2110  Group Topic: 100 Richy Mccoy RN        Group Therapy Note    Attendees: 10       Patient's Goal:  Not to be so anxious    Notes:  Feels like things are going pretty well. Pegge Schuster it was nice to talk to people and \"just chill. \" Feels like he's doing better now and feels better than when he left last time. Status After Intervention:  Improved    Participation Level:  Active Listener and Interactive    Participation Quality: Appropriate, Attentive, Sharing, and Supportive      Speech:  normal      Thought Process/Content: Logical  Linear      Affective Functioning: Congruent      Mood:slightly anxious      Level of consciousness:  Alert and Oriented x4      Response to Learning: Able to verbalize current knowledge/experience, Able to verbalize/acknowledge new learning, and Able to retain information      Endings: None Reported    Modes of Intervention: Education, Support, and Socialization      Discipline Responsible: Registered Nurse      Signature:  Serenity Doss RN

## 2023-04-03 VITALS
HEIGHT: 71 IN | DIASTOLIC BLOOD PRESSURE: 75 MMHG | TEMPERATURE: 97.9 F | OXYGEN SATURATION: 98 % | BODY MASS INDEX: 22.26 KG/M2 | SYSTOLIC BLOOD PRESSURE: 126 MMHG | WEIGHT: 159 LBS | RESPIRATION RATE: 18 BRPM | HEART RATE: 81 BPM

## 2023-04-03 PROCEDURE — 5130000000 HC BRIDGE APPOINTMENT

## 2023-04-03 PROCEDURE — 6370000000 HC RX 637 (ALT 250 FOR IP): Performed by: PSYCHIATRY & NEUROLOGY

## 2023-04-03 RX ORDER — PALIPERIDONE 3 MG/1
3 TABLET, EXTENDED RELEASE ORAL DAILY
Qty: 30 TABLET | Refills: 0 | Status: SHIPPED | OUTPATIENT
Start: 2023-04-04

## 2023-04-03 RX ORDER — HYDROXYZINE 50 MG/1
50 TABLET, FILM COATED ORAL DAILY PRN
Qty: 20 TABLET | Refills: 0 | Status: SHIPPED | OUTPATIENT
Start: 2023-04-03

## 2023-04-03 RX ADMIN — PALIPERIDONE 3 MG: 3 TABLET, EXTENDED RELEASE ORAL at 08:38

## 2023-04-03 RX ADMIN — HYDROXYZINE HYDROCHLORIDE 50 MG: 50 TABLET, FILM COATED ORAL at 12:41

## 2023-04-03 NOTE — GROUP NOTE
Group Therapy Note    Date: 4/2/2023    Group Start Time: 2045  Group End Time: 2130  Group Topic: 100 Richy Mccoy RN        Group Therapy Note    Attendees: 10       Patient's Goal:  States his goal was to stop crying so much. Notes:  States that he didn't make his goal because he found out that his 24year old cat has cancer. Hopes to leave here tomorrow so he can see the cat before it dies. ( Evidently cat being euthanized tomorrow. Discussed how he cries himself to sleep at night. He says he thinks it's part of his ritual to cry at night more than just being sad. Discussed the pros of crying, etc.    Status After Intervention:  Unchanged    Participation Level:  Active Listener and Interactive    Participation Quality: Appropriate, Attentive, Sharing, and Supportive      Speech:  normal      Thought Process/Content: Logical  Linear      Affective Functioning: Congruent      Mood:  sad about his pet      Level of consciousness:  Alert, Oriented x4, and Attentive      Response to Learning: Able to verbalize current knowledge/experience, Able to verbalize/acknowledge new learning, Able to retain information, and Capable of insight      Endings: None Reported    Modes of Intervention: Education, Support, and Socialization      Discipline Responsible: Registered Nurse      Signature:  Landon Cedillo RN

## 2023-04-03 NOTE — BH NOTE
[facility]  Discharge Note    Pt discharged with followings belongings:   Dental Appliances: None  Vision - Corrective Lenses: Eyeglasses, At bedside  Hearing Aid: None  Jewelry: None  Body Piercings Removed: N/A  Clothing: Jacket/Coat, Pants, Robe, Slippers, Shirt, Socks, Undergarments (parents dropped off bag of clothing)  Other Valuables: Other (Comment) (Pt mother took items home)   Valuables returned to patient. Patient educated on aftercare instructions: yes  Information faxed to [address] [place] [address]- [phone] by Lavella Sacks  at 2:58 PM .Patient verbalize understanding of AVS:  yes. Status EXAM upon discharge:  Mental Status and Behavioral Exam  Normal: Yes  Level of Assistance: Independent/Self  Facial Expression: Brightened  Affect: Appropriate, Stable  Level of Consciousness: Alert  Frequency of Checks: 4 times per hour, close  Mood:Normal: Yes  Mood: Anxious  Motor Activity:Normal: Yes  Motor Activity: Decreased  Eye Contact: Good  Observed Behavior: Friendly, Cooperative  Sexual Misconduct History: Current - no  Preception: [place] to person, [place] to time, [place] to place, [place] to situation  Attention:Normal: Yes  Attention: Distractible  Thought Processes: Unremarkable  Thought Content:Normal: Yes  Thought Content: Preoccupations  Depression Symptoms: Sleep disturbance  Anxiety Symptoms: No problems reported or observed. Stephanie Symptoms: No problems reported or observed. Hallucinations:  Auditory (comment)  Delusions: No  Delusions: Persecutory  Memory:Normal: Yes  Memory: Poor recent, Poor remote  Insight and Judgment: No  Insight and Judgment: Poor judgment, Poor insight    Tobacco Screening:  Practical Counseling, on admission, lucy X, if applicable and completed (first 3 are required if patient doesn't refuse):            ( ) Recognizing danger situations (included triggers and roadblocks)                    ( ) Coping

## 2023-04-03 NOTE — PLAN OF CARE
Padilla Dang has been asleep all evening. He was medicated with PRN Zyprexa PO around 1500 yesterday. He awakened easily for HS Depakote. Denies SI/HI. Denies AVH \"right now. \" Withdrawn and guarded with poor eye contact. States just feels tired. Denies needs. Went back to sleep after HS Depakote. Will continue to monitor.
Patient friendly and cooperative on assessment. Patient A&O, denies all. Patient friendly with staff and peers however patient is currently upset over his cat being sick. PRN atarax given    Problem: Psychosis  Goal: Will report no hallucinations or delusions  Description: INTERVENTIONS:  1. Administer medication as  ordered  2. Assist with reality testing to support increasing orientation  3. Assess if patient's hallucinations or delusions are encouraging self harm or harm to others and intervene as appropriate  4/2/2023 2148 by Cyndi Griffin LPN  Outcome: Not Progressing     Problem: Risk for Elopement  Goal: Patient will not exit the unit/facility without proper excort  4/2/2023 2148 by Cyndi Griffin LPN  Outcome: Progressing     Problem: Self Harm/Suicidality  Goal: Will have no self-injury during hospital stay  Description: INTERVENTIONS:  1. Ensure constant observer at bedside with Q15M safety checks  2. Maintain a safe environment  3. Secure patient belongings  4. Ensure family/visitors adhere to safety recommendations  5. Ensure safety tray has been added to patient's diet order  6. Every shift and PRN: Re-assess suicidal risk via Frequent Screener    4/2/2023 2148 by Cyndi Griffin LPN  Outcome: Progressing     Problem: Involuntary Admit  Goal: Will cooperate with staff recommendations and doctor's orders and will demonstrate appropriate behavior  Description: INTERVENTIONS:  1. Treat underlying conditions and offer medication as ordered  2. Educate regarding involuntary admission procedures and rules  3.  Contain excessive/inappropriate behavior per unit and hospital policies  3/2/6240 [id number] by Cyndi Griffin LPN  Outcome: Progressing     Problem: Pain  Goal: Verbalizes/displays adequate comfort level or baseline comfort level  4/2/2023 2148 by Cyndi Griffin LPN  Outcome: Progressing
Problem: Psychosis  Goal: Will report no hallucinations or delusions  Description: INTERVENTIONS:  1. Administer medication as  ordered  2. Assist with reality testing to support increasing orientation  3. Assess if patient's hallucinations or delusions are encouraging self harm or harm to others and intervene as appropriate  4/1/2023 0310 by Kirt Nuñez RN  Outcome: Nilton Bragg denies current SI/HI, denies A/V/H. Medication compliant and remains absent of self harm thus far this shift. Remains withdrawn to self and minimally social. Poor eye contact.
Problem: Risk for Elopement  Goal: Patient will not exit the unit/facility without proper excort  Outcome: Progressing     Problem: Self Harm/Suicidality  Goal: Will have no self-injury during hospital stay  Description: INTERVENTIONS:  1. Ensure constant observer at bedside with Q15M safety checks  2. Maintain a safe environment  3. Secure patient belongings  4. Ensure family/visitors adhere to safety recommendations  5. Ensure safety tray has been added to patient's diet order  6. Every shift and PRN: Re-assess suicidal risk via Frequent Screener    Outcome: Progressing     Problem: Depression  Goal: Will be euthymic at discharge  Description: INTERVENTIONS:  1. Administer medication as ordered  2. Provide emotional support via 1:1 interaction with staff  3. Encourage involvement in milieu/groups/activities  4. Monitor for social isolation  Outcome: Progressing     Problem: Psychosis  Goal: Will report no hallucinations or delusions  Description: INTERVENTIONS:  1. Administer medication as  ordered  2. Assist with reality testing to support increasing orientation  3. Assess if patient's hallucinations or delusions are encouraging self harm or harm to others and intervene as appropriate  Outcome: Progressing     Problem: Sleep Disturbance  Goal: Will exhibit normal sleeping pattern  Description: INTERVENTIONS:  1. Administer medication as ordered  2. Decrease environmental stimuli, including noise, as appropriate  3. Discourage social isolation and naps during the day  Outcome: Progressing     Problem: Involuntary Admit  Goal: Will cooperate with staff recommendations and doctor's orders and will demonstrate appropriate behavior  Description: INTERVENTIONS:  1. Treat underlying conditions and offer medication as ordered  2. Educate regarding involuntary admission procedures and rules  3.  Contain excessive/inappropriate behavior per unit and hospital policies  Outcome: Progressing
Problem: Risk for Elopement  Goal: Patient will not exit the unit/facility without proper excort  Outcome: Progressing     Problem: Self Harm/Suicidality  Goal: Will have no self-injury during hospital stay  Description: INTERVENTIONS:  1. Ensure constant observer at bedside with Q15M safety checks  2. Maintain a safe environment  3. Secure patient belongings  4. Ensure family/visitors adhere to safety recommendations  5. Ensure safety tray has been added to patient's diet order  6. Every shift and PRN: Re-assess suicidal risk via Frequent Screener    Outcome: Progressing     Problem: Depression  Goal: Will be euthymic at discharge  Description: INTERVENTIONS:  1. Administer medication as ordered  2. Provide emotional support via 1:1 interaction with staff  3. Encourage involvement in milieu/groups/activities  4. Monitor for social isolation  Outcome: Progressing     Problem: Psychosis  Goal: Will report no hallucinations or delusions  Description: INTERVENTIONS:  1. Administer medication as  ordered  2. Assist with reality testing to support increasing orientation  3. Assess if patient's hallucinations or delusions are encouraging self harm or harm to others and intervene as appropriate  Outcome: Progressing     Problem: Sleep Disturbance  Goal: Will exhibit normal sleeping pattern  Description: INTERVENTIONS:  1. Administer medication as ordered  2. Decrease environmental stimuli, including noise, as appropriate  3. Discourage social isolation and naps during the day  Outcome: Progressing     Problem: Involuntary Admit  Goal: Will cooperate with staff recommendations and doctor's orders and will demonstrate appropriate behavior  Description: INTERVENTIONS:  1. Treat underlying conditions and offer medication as ordered  2. Educate regarding involuntary admission procedures and rules  3.  Contain excessive/inappropriate behavior per unit and hospital policies  Outcome: Progressing       Patient was mostly
Progressing  4/2/2023 0617 by Tuan Duff RN  Outcome: Progressing     Problem: Involuntary Admit  Goal: Will cooperate with staff recommendations and doctor's orders and will demonstrate appropriate behavior  Description: INTERVENTIONS:  1. Treat underlying conditions and offer medication as ordered  2. Educate regarding involuntary admission procedures and rules  3.  Contain excessive/inappropriate behavior per unit and hospital policies  2/8/6143 [id number] by Roosevelt Barrientos RN  Outcome: Not Progressing  4/2/2023 0617 by Tuan Duff RN  Outcome: Progressing     Problem: Pain  Goal: Verbalizes/displays adequate comfort level or baseline comfort level  4/2/2023 1142 by Roosevelt Barrientos RN  Outcome: Not Progressing  4/2/2023 0617 by Tuan Duff RN  Outcome: Progressing
and offer medication as ordered  2. Educate regarding involuntary admission procedures and rules  3.  Contain excessive/inappropriate behavior per unit and hospital policies  Outcome: Not Progressing
endorse AH. Continues with flat, blunt affect. Patient discussed his recent trauma and stated \"I don't know how to get over things, because of my Autism I don't like change. \"  Patient has had complaints of increased agitation and pain which he utilized PRN medications which were effective. He has had decreased appetite this shift encouraged fluid intake and patient was agreeable. He has been able to verbalize his needs to staff and has been cooperative with care.

## 2023-04-03 NOTE — PROGRESS NOTES
1:1 Assessment 10 minutes. Patient is alert and oriented x 3. Uses direct eye contact and is dressed appropriately in street clothing. Patient denied SI/HI,A/V hallucinations. The patient stated his mood was:\"Fairly good mood tonight\". Patient did attend groups this shift and participated appropriately. The patient does understand why they are here. Patient is hoping to be discharged from the hospital later in the week. Patient is medication compliant. Judgement,insight and impulse control are limited. Patient denied any physical complaints this evening. Vital signs are noted. Safety checks continue Q 15 minutes throughout the shift. PRNS this shift? Maalox, zyprexa 10 mg= EFF. Patient obtained 5 hours of sleep this shift.
Behavioral Services  Medicare Certification Upon Admission    I certify that this patient's inpatient psychiatric hospital admission is medically necessary for:    [x] (1) Treatment which could reasonably be expected to improve this patient's condition,       [x] (2) Or for diagnostic study;     AND     [x](2) The inpatient psychiatric services are provided while the individual is under the care of a physician and are included in the individualized plan of care.     Estimated length of stay/service 5-8 days    Plan for post-hospital care incomplete     Electronically signed by Calista Wagner MD on 3/31/2023 at 8:59 AM
Group Therapy Note    Date: 4/3/2023  Start Time: 1300  End Time:  1400  Number of Participants: 10    Type of Group: Music Group    Notes:  Pt present for Music Group. Pt actively participated by making song selections and singing along to music. Participation Level:  Active Listener and Interactive    Participation Quality: Appropriate and Attentive      Speech:  normal      Affective Functioning: Congruent      Endings: None Reported    Modes of Intervention: Support, Socialization, Activity, and Media      Discipline Responsible: Chaplain Mauri Louis       04/03/23 1450   Encounter Summary   Encounter Overview/Reason  Behavioral Health   Service Provided For: Patient   Last Encounter    (4/3 Music Group)   Complexity of Encounter Moderate   Begin Time 1300   End Time  1400   Total Time Calculated 60 min   Behavioral Health    Type  Spirituality Group
Level of Care Disposition: To be admitted      Patient was seen by ED provider and [facility] AN [facility]FFIL[facility]ATE OF [facility] staff. The case presented to psychiatric provider on-call Dr. Delmer Masterson. Based on the ED evaluation and information presented to the provider by Jeffrey Bass RN/BC it is the recommendation of the on call psychiatric provider that inpatient hospitalization is the least restrictive environment for the patient at this time. The patient will be admitted to the inpatient unit. Admitting provider did not order suicide precautions based on patient denied SI/HI, doesn't want to hurt himself, would like the voices to stop.               Insurance Pre certification Authorization: TBD
Patient arrived on unit via wheelchair escorted by Security and [facility] AN [facility]FFILI[facility] OF [facility] staff. Vital signs obtained, declined snacks and fluids.
Patient calm and cooperative with interview assessment. Patient alert and oriented to self , time and place. Patient reports auditory hallucinations that tell him to hurt himself. Patient states that they are always there. Patient denies SI/HI and pain. Patient states that he will alert staff if he has thoughts of self harm. Patient resting in bed.
Patient came to desk stating he was not feeling well. Slight dizziness. Patient was asked for him to return to his room. Orthostatic Blood pressures obtained. WNL. Patient states he was having racing thoughts and is sad about not being able to be to go home. Patient noted to be wringing his hands, pacing or rocking back and forth. Patient offered Zyprexa for agitation and patient was agreeable. Zyprexa given per order. Patient able to go into the [place] [address] to listen to calming music at this time.
Patient came to the team station. He requests something to help decrease his anxiety due to explosive behavior from a peer. He was offered zyprexa 10 mg po for agitation.
Patient complained of feeling like he is having a lot of gas, PRN maalox given
Patient complaining of increased anxiety. Rates his anxiety a 6 on a 1-10 scale. Hydroxyzine given per order.   See [facility]
Patient requested and received Maalox 30 mls po for c/o. Patient said it will help.
Reassessment of PRN Hydroxyzine. Patient noted to be resting in room eyes, closed, respirations even and easy. Medication effective.
Reassessment of PRN Tylenol. Patient states headache has improved. Reassessment of Olanzapine, patient noted to be resting, eyes closed respirations even and easy. Medication effective.
Reassessment of PRN Zyprexa. Patient noted to be resting, eyes closed, respirations even and easy. Medication effective.
( ) Basic information about quitting (benefits of quitting, techniques in how to quit, available resources  ( ) Referral for counseling faxed to [facility]                                                                                                                   ( ) Patient refused counseling  ( ) Patient has not smoked in the last 30 days    Metabolic Screening:    Lab Results   Component Value Date    LABA1C 4.7 03/23/2023       Lab Results   Component Value Date    CHOL 128 03/23/2023     Lab Results   Component Value Date    TRIG 126 03/23/2023     Lab Results   Component Value Date    HDL 50 03/23/2023     No components found for: [facility] [place]  Lab Results   Component Value Date    LABVLDL 25 03/23/2023         Body mass index is 22.18 kg/m². BP Readings from Last 2 Encounters:   03/30/23 109/74   03/27/23 (!) 106/55           Pt admitted with followings belongings:  Dental Appliances: None  Vision - Corrective Lenses: Eyeglasses, At bedside  Hearing Aid: None  Jewelry: None  Body Piercings Removed: N/A  Clothing: Other (Comment) (Pt mother took all  items home)  Other Valuables:  Other (Comment) (Pt mother took items home)    Amelia Xiao RN
Lymphocytes % 03/29/2023 15.7  % Final    Monocytes % 03/29/2023 7.3  % Final    Eosinophils % 03/29/2023 0.1  % Final    Basophils % 03/29/2023 0.1  % Final    Neutrophils Absolute 03/29/2023 5.5  1.7 - 7.7 K/uL Final    Lymphocytes Absolute 03/29/2023 1.1  1.0 - 5.1 K/uL Final    Monocytes Absolute 03/29/2023 0.5  0.0 - 1.3 K/uL Final    Eosinophils Absolute 03/29/2023 0.0  0.0 - 0.6 K/uL Final    Basophils Absolute 03/29/2023 0.0  0.0 - 0.2 K/uL Final    Sodium 03/29/2023 143  136 - 145 mmol/L Final    Potassium reflex Magnesium 03/29/2023 4.1  3.5 - 5.1 mmol/L Final    Chloride 03/29/2023 101  99 - 110 mmol/L Final    CO2 03/29/2023 27  21 - 32 mmol/L Final    Anion Gap 03/29/2023 15  3 - 16 Final    Glucose 03/29/2023 114 (H)  70 - 99 mg/dL Final    BUN 03/29/2023 11  7 - 20 mg/dL Final    Creatinine 03/29/2023 0.7 (L)  0.9 - 1.3 mg/dL Final    Est, Glom Filt Rate 03/29/2023 >60  >60 Final    Comment: Pediatric calculator link  Deep[employer]. org/professionals/kdoqi/gfr_calculatorped  Effective Oct 3, 2022  These results are not intended for use in patients  <25years of age. eGFR results are calculated without  a race factor using the 2021 CKD-EPI equation. Careful  clinical correlation is recommended, particularly when  comparing to results calculated using previous equations. The CKD-EPI equation is less accurate in patients with  extremes of muscle mass, extra-renal metabolism of  creatinine, excessive creatinine ingestion, or following  therapy that affects renal tubular secretion.       Calcium 03/29/2023 10.2  8.3 - 10.6 mg/dL Final    Total Protein 03/29/2023 6.9  6.4 - 8.2 g/dL Final    Albumin 03/29/2023 4.9  3.4 - 5.0 g/dL Final    Albumin/Globulin Ratio 03/29/2023 2.5 (H)  1.1 - 2.2 Final    Total Bilirubin 03/29/2023 0.3  0.0 - 1.0 mg/dL Final    Alkaline Phosphatase 03/29/2023 48  40 - 129 U/L Final    ALT 03/29/2023 10  10 - 40 U/L Final    AST 03/29/2023 10 (L)  15 - 37 U/L Final
factitious disorder. Principal Problem:    Psychosis ([address]  Active Problems:    Cannabis use, uncomplicated    Autism spectrum disorder  Resolved Problems:    * No resolved hospital problems. *     PLAN:  1. Admitted to Psychiatry for further evaluation and stabilization. 2.  On admission, continue Depakote 1000 mg nightly. Discontinued Aguayo Rachael as he maybe  experiencing significant side effects including akathisia. Held on starting a new antipsychotic for now. Order q.15-minute checks for safety, programming, and p.r.n. medication for anxiety, agitation, and insomnia. 3/31/2023 - start Invega 3mg daily. R/O factitious disorder. 3.  Hospitalist consult for admission. No additional findings. 4.  Collateral information collected at [facility] AN [facility]FFILIATE OF [facility]. 5.  Estimated length of stay 5-8 days for stabilization. He is  voluntary. Total time with patient was 50 minutes and more than 50 % of that time was spent counseling the patient on their symptoms, treatment, and expected goals.      Matthew Hernandez MD
factors: Recently admitted to inpatient behavioral health unit with diagnosis of psychosis. C-SSRS flowsheet is  Complete. Psychiatric History (including current outpatient provider and past inpatient admissions): recent admission to [facility] last week for psychosis.     Access to Firearms: no    ASSESSMENT FOR IMMINENT FUTURE DANGER:    RISK FACTORS:    [x]  Age <25 or >49   [x]  Male gender   [x]  Depressed mood   []  Active suicidal ideation   []  Suicide plan   []  Suicide attempt   []  Access to lethal means   []  Prior suicide attempt   [x]  Active substance abuse (most likely marijuana) unable to give a urine specimen   []  Highly impulsive behaviors   [x]  Not attending to self-care/ADLs    []  Recent significant loss   []  Chronic pain or medical illness   [x]  Social isolation   []  History of violence (if yes please elaborate )   [x]  Active psychosis   []  Cognitive impairment    []  No outpatient services in place   []  Medication noncompliance   []  No collateral information to support safety  [] Self- injurious/ Self-harm behavior    PROTECTIVE FACTORS:  [] Age >25 and <55  [] Female gender   [] Denies depression  [] Denies suicidal ideation  [] Does not have lethal plan   [] Does not have access to guns or weapons  [] Patient is verbally eliu for safety  [] No prior suicide attempts  [] No active substance abuse  [x] Patient has social or family support  [] No active psychosis or cognitive dysfunction  [x] Physically healthy  [] Has outpatient services in place  [x] Compliant with recommended medications - Per patient report  [] Collateral information from  supports patient safety

## 2023-04-03 NOTE — GROUP NOTE
Group Therapy Note    Date: 4/3/2023    Group Start Time: 1000  Group End Time: 4734  Group Topic: Psychoeducation    [facility]    CANDACE Mark        Group Therapy Note  Clinician introduced the group members to anger management. They were able to identify the physical symptoms of anger, triggers and learned new coping skills. Clinician taught the members the hand brain model of flipping their lid and grounding techniques to calm down to baseline of functioning. Attendees: 11       Patient's Goal:      Notes:  Patient was cooperative and engaged in the group discussion and activity. Patient participated in the grounding technique practices. Status After Intervention:  Improved    Participation Level:  Active Listener and Interactive    Participation Quality: Appropriate, Attentive, Sharing, and Supportive      Speech:  normal      Thought Process/Content: Logical      Affective Functioning: Congruent      Mood: euthymic      Level of consciousness:  Attentive      Response to Learning: Able to verbalize/acknowledge new learning      Endings: None Reported    Modes of Intervention: Education, Support, Exploration, and Activity      Discipline Responsible: /Counselor      Signature:  CANDACE Mark

## 2023-04-03 NOTE — BH NOTE
Spoke with patient's mother and father who report they spoke with the patient earlier today and they feel he is \"doing better than he has in a really long time. \" They both feel comfortable with the patient being discharged.

## 2023-04-04 ENCOUNTER — FOLLOWUP TELEPHONE ENCOUNTER (OUTPATIENT)
Dept: PSYCHIATRY | Age: 18
End: 2023-04-04

## 2023-04-05 ENCOUNTER — FOLLOWUP TELEPHONE ENCOUNTER (OUTPATIENT)
Dept: PSYCHIATRY | Age: 18
End: 2023-04-05

## 2023-04-06 ENCOUNTER — FOLLOWUP TELEPHONE ENCOUNTER (OUTPATIENT)
Dept: PSYCHIATRY | Age: 18
End: 2023-04-06

## 2023-06-17 ENCOUNTER — HOSPITAL ENCOUNTER (EMERGENCY)
Age: 18
Discharge: ANOTHER HOSPITAL | End: 2023-06-18
Attending: STUDENT IN AN ORGANIZED HEALTH CARE EDUCATION/TRAINING PROGRAM | Admitting: PSYCHIATRY & NEUROLOGY
Payer: OTHER GOVERNMENT

## 2023-06-17 DIAGNOSIS — M79.641 RIGHT HAND PAIN: ICD-10-CM

## 2023-06-17 DIAGNOSIS — R44.0 AUDITORY HALLUCINATIONS: ICD-10-CM

## 2023-06-17 DIAGNOSIS — R46.89 AGGRESSIVE BEHAVIOR: Primary | ICD-10-CM

## 2023-06-17 PROBLEM — F29 PSYCHOSIS, UNSPECIFIED PSYCHOSIS TYPE (HCC): Status: ACTIVE | Noted: 2023-06-17

## 2023-06-17 LAB
ALBUMIN SERPL-MCNC: 4.7 G/DL (ref 3.4–5)
ALBUMIN/GLOB SERPL: 1.6 {RATIO} (ref 1.1–2.2)
ALP SERPL-CCNC: 48 U/L (ref 40–129)
ALT SERPL-CCNC: 11 U/L (ref 10–40)
ANION GAP SERPL CALCULATED.3IONS-SCNC: 9 MMOL/L (ref 3–16)
APAP SERPL-MCNC: <5 UG/ML (ref 10–30)
AST SERPL-CCNC: 21 U/L (ref 15–37)
BASOPHILS # BLD: 0 K/UL (ref 0–0.2)
BASOPHILS NFR BLD: 0.2 %
BILIRUB SERPL-MCNC: 0.7 MG/DL (ref 0–1)
BUN SERPL-MCNC: 16 MG/DL (ref 7–20)
CALCIUM SERPL-MCNC: 9.7 MG/DL (ref 8.3–10.6)
CHLORIDE SERPL-SCNC: 99 MMOL/L (ref 99–110)
CO2 SERPL-SCNC: 25 MMOL/L (ref 21–32)
CREAT SERPL-MCNC: 1 MG/DL (ref 0.9–1.3)
DEPRECATED RDW RBC AUTO: 13.2 % (ref 12.4–15.4)
EOSINOPHIL # BLD: 0 K/UL (ref 0–0.6)
EOSINOPHIL NFR BLD: 0.1 %
ETHANOLAMINE SERPL-MCNC: NORMAL MG/DL (ref 0–0.08)
GFR SERPLBLD CREATININE-BSD FMLA CKD-EPI: >60 ML/MIN/{1.73_M2}
GLUCOSE SERPL-MCNC: 88 MG/DL (ref 70–99)
HCT VFR BLD AUTO: 42.1 % (ref 40.5–52.5)
HGB BLD-MCNC: 14.5 G/DL (ref 13.5–17.5)
LYMPHOCYTES # BLD: 0.8 K/UL (ref 1–5.1)
LYMPHOCYTES NFR BLD: 6.1 %
MCH RBC QN AUTO: 30.6 PG (ref 26–34)
MCHC RBC AUTO-ENTMCNC: 34.5 G/DL (ref 31–36)
MCV RBC AUTO: 88.8 FL (ref 80–100)
MONOCYTES # BLD: 1 K/UL (ref 0–1.3)
MONOCYTES NFR BLD: 7.6 %
NEUTROPHILS # BLD: 11.5 K/UL (ref 1.7–7.7)
NEUTROPHILS NFR BLD: 86 %
PLATELET # BLD AUTO: 224 K/UL (ref 135–450)
PMV BLD AUTO: 8.3 FL (ref 5–10.5)
POTASSIUM SERPL-SCNC: 3.8 MMOL/L (ref 3.5–5.1)
PROCALCITONIN SERPL IA-MCNC: 0.02 NG/ML (ref 0–0.15)
PROT SERPL-MCNC: 7.7 G/DL (ref 6.4–8.2)
RBC # BLD AUTO: 4.74 M/UL (ref 4.2–5.9)
SALICYLATES SERPL-MCNC: <0.3 MG/DL (ref 15–30)
SODIUM SERPL-SCNC: 133 MMOL/L (ref 136–145)
WBC # BLD AUTO: 13.4 K/UL (ref 4–11)

## 2023-06-17 PROCEDURE — 80307 DRUG TEST PRSMV CHEM ANLYZR: CPT

## 2023-06-17 PROCEDURE — 80143 DRUG ASSAY ACETAMINOPHEN: CPT

## 2023-06-17 PROCEDURE — 85025 COMPLETE CBC W/AUTO DIFF WBC: CPT

## 2023-06-17 PROCEDURE — 6360000002 HC RX W HCPCS: Performed by: STUDENT IN AN ORGANIZED HEALTH CARE EDUCATION/TRAINING PROGRAM

## 2023-06-17 PROCEDURE — 99285 EMERGENCY DEPT VISIT HI MDM: CPT

## 2023-06-17 PROCEDURE — 80053 COMPREHEN METABOLIC PANEL: CPT

## 2023-06-17 PROCEDURE — 96372 THER/PROPH/DIAG INJ SC/IM: CPT

## 2023-06-17 PROCEDURE — 84145 PROCALCITONIN (PCT): CPT

## 2023-06-17 PROCEDURE — 36415 COLL VENOUS BLD VENIPUNCTURE: CPT

## 2023-06-17 PROCEDURE — 80179 DRUG ASSAY SALICYLATE: CPT

## 2023-06-17 PROCEDURE — 81001 URINALYSIS AUTO W/SCOPE: CPT

## 2023-06-17 PROCEDURE — 82077 ASSAY SPEC XCP UR&BREATH IA: CPT

## 2023-06-17 PROCEDURE — 1200000000 HC SEMI PRIVATE

## 2023-06-17 RX ORDER — LORAZEPAM 2 MG/ML
1 INJECTION INTRAMUSCULAR ONCE
Status: COMPLETED | OUTPATIENT
Start: 2023-06-17 | End: 2023-06-17

## 2023-06-17 RX ORDER — OLANZAPINE 10 MG/1
10 INJECTION, POWDER, LYOPHILIZED, FOR SOLUTION INTRAMUSCULAR DAILY PRN
Status: CANCELLED | OUTPATIENT
Start: 2023-06-17

## 2023-06-17 RX ORDER — OLANZAPINE 5 MG/1
5 TABLET ORAL 3 TIMES DAILY PRN
Status: CANCELLED | OUTPATIENT
Start: 2023-06-17

## 2023-06-17 RX ORDER — ACETAMINOPHEN 325 MG/1
650 TABLET ORAL EVERY 8 HOURS PRN
Status: CANCELLED | OUTPATIENT
Start: 2023-06-17

## 2023-06-17 RX ORDER — POLYETHYLENE GLYCOL 3350 17 G
2 POWDER IN PACKET (EA) ORAL
Status: CANCELLED | OUTPATIENT
Start: 2023-06-17

## 2023-06-17 RX ORDER — LORAZEPAM 2 MG/1
2 TABLET ORAL
Status: CANCELLED | OUTPATIENT
Start: 2023-06-17 | End: 2023-06-18

## 2023-06-17 RX ADMIN — LORAZEPAM 1 MG: 2 INJECTION INTRAMUSCULAR; INTRAVENOUS at 14:04

## 2023-06-18 ENCOUNTER — APPOINTMENT (OUTPATIENT)
Dept: GENERAL RADIOLOGY | Age: 18
End: 2023-06-18
Payer: OTHER GOVERNMENT

## 2023-06-18 VITALS
OXYGEN SATURATION: 96 % | RESPIRATION RATE: 19 BRPM | SYSTOLIC BLOOD PRESSURE: 113 MMHG | HEIGHT: 71 IN | WEIGHT: 175 LBS | HEART RATE: 89 BPM | BODY MASS INDEX: 24.5 KG/M2 | TEMPERATURE: 97 F | DIASTOLIC BLOOD PRESSURE: 72 MMHG

## 2023-06-18 LAB
AMORPH SED URNS QL MICRO: NORMAL /HPF
AMPHETAMINES UR QL SCN>1000 NG/ML: ABNORMAL
BARBITURATES UR QL SCN>200 NG/ML: ABNORMAL
BENZODIAZ UR QL SCN>200 NG/ML: ABNORMAL
BILIRUB UR QL STRIP.AUTO: NEGATIVE
CANNABINOIDS UR QL SCN>50 NG/ML: POSITIVE
CLARITY UR: ABNORMAL
COCAINE UR QL SCN: ABNORMAL
COLOR UR: YELLOW
DRUG SCREEN COMMENT UR-IMP: ABNORMAL
FENTANYL SCREEN, URINE: ABNORMAL
GLUCOSE UR STRIP.AUTO-MCNC: NEGATIVE MG/DL
HGB UR QL STRIP.AUTO: NEGATIVE
KETONES UR STRIP.AUTO-MCNC: NEGATIVE MG/DL
LEUKOCYTE ESTERASE UR QL STRIP.AUTO: NEGATIVE
METHADONE UR QL SCN>300 NG/ML: ABNORMAL
NITRITE UR QL STRIP.AUTO: NEGATIVE
OPIATES UR QL SCN>300 NG/ML: ABNORMAL
OXYCODONE UR QL SCN: ABNORMAL
PCP UR QL SCN>25 NG/ML: ABNORMAL
PH UR STRIP.AUTO: 7.5 [PH] (ref 5–8)
PH UR STRIP: 7.5 [PH]
PROT UR STRIP.AUTO-MCNC: NEGATIVE MG/DL
RBC #/AREA URNS HPF: NORMAL /HPF (ref 0–4)
SP GR UR STRIP.AUTO: 1.01 (ref 1–1.03)
UA COMPLETE W REFLEX CULTURE PNL UR: ABNORMAL
UA DIPSTICK W REFLEX MICRO PNL UR: YES
URN SPEC COLLECT METH UR: ABNORMAL
UROBILINOGEN UR STRIP-ACNC: 0.2 E.U./DL
WBC #/AREA URNS HPF: NORMAL /HPF (ref 0–5)

## 2023-06-18 PROCEDURE — 99285 EMERGENCY DEPT VISIT HI MDM: CPT | Performed by: STUDENT IN AN ORGANIZED HEALTH CARE EDUCATION/TRAINING PROGRAM

## 2023-06-18 PROCEDURE — 73610 X-RAY EXAM OF ANKLE: CPT

## 2023-06-18 PROCEDURE — 73130 X-RAY EXAM OF HAND: CPT

## 2023-06-18 ASSESSMENT — PAIN - FUNCTIONAL ASSESSMENT: PAIN_FUNCTIONAL_ASSESSMENT: NONE - DENIES PAIN
